# Patient Record
Sex: FEMALE | Race: WHITE | NOT HISPANIC OR LATINO | Employment: FULL TIME | URBAN - NONMETROPOLITAN AREA
[De-identification: names, ages, dates, MRNs, and addresses within clinical notes are randomized per-mention and may not be internally consistent; named-entity substitution may affect disease eponyms.]

---

## 2023-03-23 DIAGNOSIS — D64.9 ANEMIA, UNSPECIFIED TYPE: Primary | ICD-10-CM

## 2023-03-23 LAB
COBALAMIN (VITAMIN B12) (PG/ML) IN SER/PLAS: 225 PG/ML (ref 211–911)
ERYTHROCYTE DISTRIBUTION WIDTH (RATIO) BY AUTOMATED COUNT: 14.1 % (ref 11.5–14.5)
ERYTHROCYTE MEAN CORPUSCULAR HEMOGLOBIN CONCENTRATION (G/DL) BY AUTOMATED: 31 G/DL (ref 32–36)
ERYTHROCYTE MEAN CORPUSCULAR VOLUME (FL) BY AUTOMATED COUNT: 87 FL (ref 80–100)
ERYTHROCYTES (10*6/UL) IN BLOOD BY AUTOMATED COUNT: 4.21 X10E12/L (ref 4–5.2)
FERRITIN (UG/LL) IN SER/PLAS: 17 UG/L (ref 8–150)
FOLATE (NG/ML) IN SER/PLAS: 13.6 NG/ML
HEMATOCRIT (%) IN BLOOD BY AUTOMATED COUNT: 36.5 % (ref 36–46)
HEMOGLOBIN (G/DL) IN BLOOD: 11.3 G/DL (ref 12–16)
IRON (UG/DL) IN SER/PLAS: 77 UG/DL (ref 35–150)
IRON BINDING CAPACITY (UG/DL) IN SER/PLAS: 493 UG/DL (ref 240–445)
IRON SATURATION (%) IN SER/PLAS: 16 % (ref 25–45)
LEUKOCYTES (10*3/UL) IN BLOOD BY AUTOMATED COUNT: 7.1 X10E9/L (ref 4.4–11.3)
PLATELETS (10*3/UL) IN BLOOD AUTOMATED COUNT: 272 X10E9/L (ref 150–450)

## 2023-05-08 ENCOUNTER — TELEPHONE (OUTPATIENT)
Dept: PRIMARY CARE | Facility: CLINIC | Age: 53
End: 2023-05-08
Payer: COMMERCIAL

## 2023-05-08 DIAGNOSIS — G47.00 INSOMNIA, UNSPECIFIED TYPE: ICD-10-CM

## 2023-05-08 RX ORDER — ZOLPIDEM TARTRATE 5 MG/1
5 TABLET ORAL NIGHTLY PRN
Qty: 30 TABLET | Refills: 0 | Status: SHIPPED | OUTPATIENT
Start: 2023-05-08 | End: 2023-10-30 | Stop reason: SDUPTHER

## 2023-05-08 RX ORDER — ZOLPIDEM TARTRATE 5 MG/1
1 TABLET ORAL NIGHTLY PRN
COMMUNITY
Start: 2013-09-04 | End: 2023-05-08 | Stop reason: SDUPTHER

## 2023-05-08 NOTE — TELEPHONE ENCOUNTER
PT had a refill remaining on her her last RX but neglected to get fill, the RX has since .  She is asking for a new RX for Ambien 10 mg RX needs to go to [facility] and needs to be mailed to her. -She has an upcoming trip to [place] and wont be sleeping well.

## 2023-05-16 ENCOUNTER — TELEPHONE (OUTPATIENT)
Dept: PRIMARY CARE | Facility: CLINIC | Age: 53
End: 2023-05-16
Payer: COMMERCIAL

## 2023-05-16 NOTE — TELEPHONE ENCOUNTER
Pt left a msg asking for refills of her Trazodone 100mg, and Lexapro 5mg.  Pharm is [facility].

## 2023-05-17 DIAGNOSIS — F32.A ANXIETY AND DEPRESSION: ICD-10-CM

## 2023-05-17 DIAGNOSIS — F41.9 ANXIETY AND DEPRESSION: ICD-10-CM

## 2023-05-17 DIAGNOSIS — G47.00 INSOMNIA, UNSPECIFIED TYPE: ICD-10-CM

## 2023-05-17 RX ORDER — ESCITALOPRAM OXALATE 5 MG/1
5 TABLET ORAL DAILY
Qty: 90 TABLET | Refills: 3 | Status: SHIPPED | OUTPATIENT
Start: 2023-05-17 | End: 2023-05-17

## 2023-05-17 RX ORDER — TRAZODONE HYDROCHLORIDE 100 MG/1
100 TABLET ORAL NIGHTLY
Qty: 90 TABLET | Refills: 3 | Status: SHIPPED | OUTPATIENT
Start: 2023-05-17 | End: 2023-05-17

## 2023-05-17 RX ORDER — TRAZODONE HYDROCHLORIDE 100 MG/1
1 TABLET ORAL NIGHTLY
COMMUNITY
Start: 2014-09-24 | End: 2023-05-17 | Stop reason: SDUPTHER

## 2023-05-17 RX ORDER — ESCITALOPRAM OXALATE 5 MG/1
1 TABLET ORAL DAILY
COMMUNITY
Start: 2020-03-24 | End: 2023-05-17 | Stop reason: SDUPTHER

## 2023-08-12 DIAGNOSIS — L03.113 CELLULITIS OF RIGHT UPPER EXTREMITY: Primary | ICD-10-CM

## 2023-08-12 RX ORDER — AMOXICILLIN AND CLAVULANATE POTASSIUM 875; 125 MG/1; MG/1
1 TABLET, FILM COATED ORAL
Qty: 20 TABLET | Refills: 0 | Status: SHIPPED | OUTPATIENT
Start: 2023-08-12 | End: 2023-08-22

## 2023-08-13 ENCOUNTER — TELEPHONE (OUTPATIENT)
Dept: PEDIATRICS | Facility: CLINIC | Age: 53
End: 2023-08-13
Payer: COMMERCIAL

## 2023-08-13 DIAGNOSIS — T63.441A BEE STING, ACCIDENTAL OR UNINTENTIONAL, INITIAL ENCOUNTER: Primary | ICD-10-CM

## 2023-08-13 RX ORDER — PREDNISONE 20 MG/1
60 TABLET ORAL DAILY
Qty: 15 TABLET | Refills: 0 | Status: SHIPPED | OUTPATIENT
Start: 2023-08-13 | End: 2023-08-18

## 2023-09-05 DIAGNOSIS — T63.441A BEE STING, ACCIDENTAL OR UNINTENTIONAL, INITIAL ENCOUNTER: Primary | ICD-10-CM

## 2023-09-05 RX ORDER — AMOXICILLIN AND CLAVULANATE POTASSIUM 875; 125 MG/1; MG/1
1 TABLET, FILM COATED ORAL
Qty: 20 TABLET | Refills: 0 | Status: SHIPPED | OUTPATIENT
Start: 2023-09-05 | End: 2023-09-15

## 2023-09-05 RX ORDER — PREDNISONE 20 MG/1
60 TABLET ORAL DAILY
Qty: 15 TABLET | Refills: 0 | Status: SHIPPED | OUTPATIENT
Start: 2023-09-05 | End: 2023-09-10

## 2023-10-30 ENCOUNTER — PHARMACY VISIT (OUTPATIENT)
Dept: PHARMACY | Facility: CLINIC | Age: 53
End: 2023-10-30
Payer: COMMERCIAL

## 2023-10-30 DIAGNOSIS — K21.9 GASTROESOPHAGEAL REFLUX DISEASE, UNSPECIFIED WHETHER ESOPHAGITIS PRESENT: ICD-10-CM

## 2023-10-30 DIAGNOSIS — G47.00 INSOMNIA, UNSPECIFIED TYPE: ICD-10-CM

## 2023-10-30 DIAGNOSIS — D64.9 ANEMIA, UNSPECIFIED TYPE: Primary | ICD-10-CM

## 2023-10-30 DIAGNOSIS — E03.9 HYPOTHYROIDISM, UNSPECIFIED TYPE: ICD-10-CM

## 2023-10-30 PROCEDURE — RXMED WILLOW AMBULATORY MEDICATION CHARGE

## 2023-10-30 RX ORDER — ZOLPIDEM TARTRATE 5 MG/1
5 TABLET ORAL NIGHTLY PRN
Qty: 30 TABLET | Refills: 0 | Status: SHIPPED | OUTPATIENT
Start: 2023-10-30 | End: 2024-03-12

## 2023-10-30 RX ORDER — OMEPRAZOLE 40 MG/1
40 CAPSULE, DELAYED RELEASE ORAL
Qty: 30 CAPSULE | Refills: 1 | Status: SHIPPED | OUTPATIENT
Start: 2023-10-30 | End: 2023-12-25 | Stop reason: SDUPTHER

## 2023-10-31 ENCOUNTER — PHARMACY VISIT (OUTPATIENT)
Dept: PHARMACY | Facility: CLINIC | Age: 53
End: 2023-10-31

## 2023-11-02 DIAGNOSIS — J01.00 ACUTE NON-RECURRENT MAXILLARY SINUSITIS: Primary | ICD-10-CM

## 2023-11-02 RX ORDER — AMOXICILLIN AND CLAVULANATE POTASSIUM 875; 125 MG/1; MG/1
1 TABLET, FILM COATED ORAL
Qty: 20 TABLET | Refills: 0 | Status: SHIPPED | OUTPATIENT
Start: 2023-11-02 | End: 2023-11-12

## 2023-11-24 ENCOUNTER — PHARMACY VISIT (OUTPATIENT)
Dept: PHARMACY | Facility: CLINIC | Age: 53
End: 2023-11-24
Payer: COMMERCIAL

## 2023-11-24 PROCEDURE — RXMED WILLOW AMBULATORY MEDICATION CHARGE

## 2023-12-25 DIAGNOSIS — K21.9 GASTROESOPHAGEAL REFLUX DISEASE, UNSPECIFIED WHETHER ESOPHAGITIS PRESENT: ICD-10-CM

## 2023-12-26 PROCEDURE — RXMED WILLOW AMBULATORY MEDICATION CHARGE

## 2023-12-26 RX ORDER — OMEPRAZOLE 40 MG/1
40 CAPSULE, DELAYED RELEASE ORAL
Qty: 90 CAPSULE | Refills: 1 | Status: SHIPPED | OUTPATIENT
Start: 2023-12-26 | End: 2024-12-25

## 2023-12-28 ENCOUNTER — PHARMACY VISIT (OUTPATIENT)
Dept: PHARMACY | Facility: CLINIC | Age: 53
End: 2023-12-28
Payer: COMMERCIAL

## 2024-01-03 ENCOUNTER — LAB (OUTPATIENT)
Dept: LAB | Facility: LAB | Age: 54
End: 2024-01-03
Payer: COMMERCIAL

## 2024-01-03 DIAGNOSIS — D64.9 ANEMIA, UNSPECIFIED TYPE: ICD-10-CM

## 2024-01-03 DIAGNOSIS — E03.9 HYPOTHYROIDISM, UNSPECIFIED TYPE: ICD-10-CM

## 2024-01-03 LAB
ERYTHROCYTE [DISTWIDTH] IN BLOOD BY AUTOMATED COUNT: 16.7 % (ref 11.5–14.5)
FERRITIN SERPL-MCNC: 22 NG/ML (ref 8–150)
FOLATE SERPL-MCNC: 17.4 NG/ML
HCT VFR BLD AUTO: 37.2 % (ref 36–46)
HGB BLD-MCNC: 11.9 G/DL (ref 12–16)
IRON SATN MFR SERPL: 9 % (ref 25–45)
IRON SERPL-MCNC: 39 UG/DL (ref 35–150)
MCH RBC QN AUTO: 27.2 PG (ref 26–34)
MCHC RBC AUTO-ENTMCNC: 32 G/DL (ref 32–36)
MCV RBC AUTO: 85 FL (ref 80–100)
NRBC BLD-RTO: 0 /100 WBCS (ref 0–0)
PLATELET # BLD AUTO: 264 X10*3/UL (ref 150–450)
RBC # BLD AUTO: 4.38 X10*6/UL (ref 4–5.2)
TIBC SERPL-MCNC: 451 UG/DL (ref 240–445)
TSH SERPL-ACNC: 2.58 MIU/L (ref 0.44–3.98)
UIBC SERPL-MCNC: 412 UG/DL (ref 110–370)
VIT B12 SERPL-MCNC: 215 PG/ML (ref 211–911)
WBC # BLD AUTO: 6.4 X10*3/UL (ref 4.4–11.3)

## 2024-01-03 PROCEDURE — 82746 ASSAY OF FOLIC ACID SERUM: CPT

## 2024-01-03 PROCEDURE — 83550 IRON BINDING TEST: CPT

## 2024-01-03 PROCEDURE — 83540 ASSAY OF IRON: CPT

## 2024-01-03 PROCEDURE — 82607 VITAMIN B-12: CPT

## 2024-01-03 PROCEDURE — 82728 ASSAY OF FERRITIN: CPT

## 2024-01-03 PROCEDURE — 85027 COMPLETE CBC AUTOMATED: CPT

## 2024-01-03 PROCEDURE — 84443 ASSAY THYROID STIM HORMONE: CPT

## 2024-01-03 PROCEDURE — 36415 COLL VENOUS BLD VENIPUNCTURE: CPT

## 2024-01-21 PROBLEM — Z01.411 ENCOUNTER FOR WELL WOMAN EXAM WITH ABNORMAL FINDINGS: Status: ACTIVE | Noted: 2024-01-21

## 2024-01-21 NOTE — PROGRESS NOTES
"Subjective   Patient ID: Rosalie Vicente is a 53 y.o. female who presents for Menorrhagia.  She was last seen on 6/16/2021 with the following documentation:  51 year old female presents as a new patient for annual exam. Pap and HPV returned negative in August 2018, 2017 and 2014. Screening mammogram returned negative 4/6/2021. She has had a tubal ligation for contraception. Menses are monthly with some variability to the flow. She has had intermittent heavy menses for the past 5 years with no change. Prior evaluation did include a normal ultrasound but results are unavailable for review. Lysteda helps on the heavy days and she requests a refill of this.   She has good results using an antibiotic with intercourse for preventing UTI and requests a prescription.   Many of her friends are older than she and have had issues with the transition into menopause. She denies any hot flushes or symptoms yet but we did discuss expectations and her worries. She was reassured that most women do well with this transition and we will help her in the future if she develops symptoms that are bothersome.   She also notes decreased libido but has normal sexual response and enjoyment with sexual activity. We discussed that libido in women is multifactorial and can change with age, hormone balance, stress, overall health and sleep. It is possible that her escitalopram could be decreasing the libido also, but she feels this medication is helpful for her and should continue.     Recent lab testing by PCP has revealed very mild iron deficiency anemia with Hgb 11.9. TSH returned in normal range.  Menses are now irregular. She was having menses about every two months for a long time, then had 6 months without bleeding. She then had onset of heavy bleeding 10 days ago. She continues to bleed heavy now. She is passing clots and changing pad every 2 hours. Prior to having menses she fatigued and \"not myself.\" She feels this is actually better " now.   We discussed expected transition into menopause with possible irregular bleeding during this time. She is willing to proceed with labs, ultrasound and provera to provide for shedding of endometrium. She is not interested in any hormone replacement unless she develops symptoms. We did discuss the complicated nature of female libido and potential benefit of using a personal lubricant. She does request refill of antibiotic to use with intercourse to prevent UTI. She declines vaginal estrogen.           Review of Systems   Constitutional:  Negative for activity change.   HENT:  Negative for congestion.    Respiratory:  Negative for apnea and cough.    Cardiovascular:  Negative for chest pain.   Gastrointestinal:  Negative for constipation and diarrhea.   Genitourinary:  Negative for hematuria and vaginal pain.   Musculoskeletal:  Negative for joint swelling.   Neurological:  Negative for dizziness.   Psychiatric/Behavioral:  Negative for agitation.        Past Medical History:   Diagnosis Date    Encounter for other preprocedural examination 09/10/2018    Preoperative testing    Other abnormal and inconclusive findings on diagnostic imaging of breast 2019    Abnormal screening mammogram    Pain in left shoulder 2018    Acute pain of left shoulder    Personal history of other diseases of the respiratory system 2017    History of acute bronchitis    Urinary tract infection, site not specified 2019    Acute UTI      Past Surgical History:   Procedure Laterality Date    BELT ABDOMINOPLASTY  2014    Abdominoplasty     SECTION, CLASSIC  2014     Section    HERNIA REPAIR  2014    Hernia Repair    OTHER SURGICAL HISTORY  2014    Laparoscopic Excision Of Ectopic Pregnancy And Salpingectomy    SINUS SURGERY  2014    Sinus Surgery    TUBAL LIGATION  2014    Tubal Ligation      No Known Allergies   Current Outpatient Medications on File Prior to  Visit   Medication Sig Dispense Refill    albuterol (Proventil HFA) 90 mcg/actuation inhaler Inhale.      calcium carbonate-vitamin D3 500 mg-5 mcg (200 unit) tablet Take by mouth twice a day.      escitalopram (Lexapro) 5 mg tablet TAKE 1 TABLET (5 MG) BY MOUTH ONCE DAILY. 90 tablet 3    fluticasone furoate-vilanteroL (Breo Ellipta) 200-25 mcg/dose inhaler Inhale 1 puff once daily.      multivitamin with minerals tablet Take by mouth.      omeprazole (PriLOSEC) 40 mg DR capsule Take 1 capsule (40 mg) by mouth once daily in the morning. Take before meals. Do not crush or chew. 90 capsule 1    traZODone (Desyrel) 100 mg tablet TAKE 1 TABLET (100 MG) BY MOUTH ONCE DAILY AT BEDTIME. 90 tablet 3    tretinoin (Retin-A) 0.025 % cream APPLY THIN LAYER TO FULL FACE NIGHTLY AS TOLERATED 45 g 4    zolpidem (Ambien) 5 mg tablet Take 1 tablet (5 mg) by mouth as needed at bedtime for sleep. 30 tablet 0    [DISCONTINUED] tranexamic acid (Lysteda) 650 mg tablet tablet Take by mouth.       No current facility-administered medications on file prior to visit.        Objective   Physical Exam  Constitutional:       Appearance: Normal appearance.   Cardiovascular:      Rate and Rhythm: Normal rate.   Pulmonary:      Effort: Pulmonary effort is normal.   Abdominal:      General: Abdomen is flat.   Neurological:      Mental Status: She is alert and oriented to person, place, and time.   Psychiatric:         Attention and Perception: Attention normal.         Mood and Affect: Mood and affect normal.         Speech: Speech normal.         Behavior: Behavior normal. Behavior is cooperative.           Problem List Items Addressed This Visit       Premenopausal menorrhagia - Primary    Overview     Heavy bleeding after no menses for 6 months. Will proceed with FSH, estradiol, pelvic ultrasound. Plan cyclic provera if no menses by cycle day 45.         Relevant Medications    medroxyPROGESTERone (Provera) 10 mg tablet    tranexamic acid  (Lysteda) 650 mg tablet tablet    Other Relevant Orders    Follicle Stimulating Hormone    Estradiol    US PELVIS TRANSABDOMINAL WITH TRANSVAGINAL    Postcoital UTI    Overview     Antibiotic after intercourse is successful in preventing UTI.         Relevant Medications    nitrofurantoin, macrocrystal-monohydrate, (Macrobid) 100 mg capsule

## 2024-01-23 PROCEDURE — RXMED WILLOW AMBULATORY MEDICATION CHARGE

## 2024-01-24 ENCOUNTER — OFFICE VISIT (OUTPATIENT)
Dept: OBSTETRICS AND GYNECOLOGY | Facility: CLINIC | Age: 54
End: 2024-01-24
Payer: COMMERCIAL

## 2024-01-24 ENCOUNTER — PHARMACY VISIT (OUTPATIENT)
Dept: PHARMACY | Facility: CLINIC | Age: 54
End: 2024-01-24
Payer: COMMERCIAL

## 2024-01-24 VITALS
BODY MASS INDEX: 27.09 KG/M2 | DIASTOLIC BLOOD PRESSURE: 78 MMHG | WEIGHT: 138 LBS | SYSTOLIC BLOOD PRESSURE: 112 MMHG | HEIGHT: 60 IN

## 2024-01-24 DIAGNOSIS — N92.4 PREMENOPAUSAL MENORRHAGIA: Primary | ICD-10-CM

## 2024-01-24 DIAGNOSIS — N39.0 POSTCOITAL UTI: ICD-10-CM

## 2024-01-24 PROCEDURE — 1036F TOBACCO NON-USER: CPT | Performed by: OBSTETRICS & GYNECOLOGY

## 2024-01-24 PROCEDURE — 99214 OFFICE O/P EST MOD 30 MIN: CPT | Performed by: OBSTETRICS & GYNECOLOGY

## 2024-01-24 RX ORDER — FERROUS SULFATE, DRIED 160(50) MG
TABLET, EXTENDED RELEASE ORAL 2 TIMES DAILY
COMMUNITY
Start: 2018-09-12

## 2024-01-24 RX ORDER — MEDROXYPROGESTERONE ACETATE 10 MG/1
10 TABLET ORAL DAILY
Qty: 30 TABLET | Refills: 3 | Status: SHIPPED | OUTPATIENT
Start: 2024-01-24 | End: 2025-01-23

## 2024-01-24 RX ORDER — TRANEXAMIC ACID 650 MG/1
1300 TABLET ORAL 2 TIMES DAILY
Qty: 30 TABLET | Refills: 2 | Status: SHIPPED | OUTPATIENT
Start: 2024-01-24

## 2024-01-24 RX ORDER — ALBUTEROL SULFATE 90 UG/1
AEROSOL, METERED RESPIRATORY (INHALATION)
COMMUNITY
Start: 2014-09-24

## 2024-01-24 RX ORDER — MULTIVIT-MIN/IRON FUM/FOLIC AC 7.5 MG-4
TABLET ORAL
COMMUNITY
Start: 2011-02-10

## 2024-01-24 RX ORDER — FLUTICASONE FUROATE AND VILANTEROL 200; 25 UG/1; UG/1
1 POWDER RESPIRATORY (INHALATION) DAILY
COMMUNITY
Start: 2016-03-30

## 2024-01-24 RX ORDER — TRANEXAMIC ACID 650 MG/1
TABLET ORAL
COMMUNITY
Start: 2014-01-23 | End: 2024-01-24 | Stop reason: SDUPTHER

## 2024-01-24 RX ORDER — NITROFURANTOIN 25; 75 MG/1; MG/1
100 CAPSULE ORAL 2 TIMES DAILY
Qty: 14 CAPSULE | Refills: 2 | Status: SHIPPED | OUTPATIENT
Start: 2024-01-24 | End: 2024-01-31

## 2024-01-24 ASSESSMENT — ENCOUNTER SYMPTOMS
JOINT SWELLING: 0
CONSTIPATION: 0
DIARRHEA: 0
AGITATION: 0
COUGH: 0
DIZZINESS: 0
HEMATURIA: 0
ACTIVITY CHANGE: 0
APNEA: 0

## 2024-01-26 ENCOUNTER — OFFICE VISIT (OUTPATIENT)
Dept: PRIMARY CARE | Facility: CLINIC | Age: 54
End: 2024-01-26
Payer: COMMERCIAL

## 2024-01-26 VITALS
TEMPERATURE: 97.9 F | SYSTOLIC BLOOD PRESSURE: 106 MMHG | DIASTOLIC BLOOD PRESSURE: 68 MMHG | BODY MASS INDEX: 27.38 KG/M2 | WEIGHT: 140.2 LBS

## 2024-01-26 DIAGNOSIS — Z00.00 ROUTINE GENERAL MEDICAL EXAMINATION AT A HEALTH CARE FACILITY: ICD-10-CM

## 2024-01-26 DIAGNOSIS — J45.40 MODERATE PERSISTENT ASTHMA WITHOUT COMPLICATION (HHS-HCC): ICD-10-CM

## 2024-01-26 DIAGNOSIS — F41.9 ANXIETY: ICD-10-CM

## 2024-01-26 DIAGNOSIS — E03.8 SUBCLINICAL HYPOTHYROIDISM: ICD-10-CM

## 2024-01-26 DIAGNOSIS — K20.0 EOSINOPHILIC ESOPHAGITIS: ICD-10-CM

## 2024-01-26 DIAGNOSIS — D64.9 ANEMIA, UNSPECIFIED TYPE: Primary | ICD-10-CM

## 2024-01-26 PROCEDURE — 1036F TOBACCO NON-USER: CPT | Performed by: INTERNAL MEDICINE

## 2024-01-26 PROCEDURE — 99214 OFFICE O/P EST MOD 30 MIN: CPT | Performed by: INTERNAL MEDICINE

## 2024-01-26 ASSESSMENT — PATIENT HEALTH QUESTIONNAIRE - PHQ9
SUM OF ALL RESPONSES TO PHQ9 QUESTIONS 1 AND 2: 0
2. FEELING DOWN, DEPRESSED OR HOPELESS: NOT AT ALL
1. LITTLE INTEREST OR PLEASURE IN DOING THINGS: NOT AT ALL

## 2024-01-26 NOTE — PROGRESS NOTES
Subjective   Patient ID: Rosalie Vicente is a 53 y.o. female who presents for Follow-up (Bllood work - anemia) and Fatigue.    HPI   Overall doing well.  Anxiety well-controlled.  Working with gynecologist.  Has had significant menstrual irregularities.  No menstruation for 6 months then just recurred.  Had been feeling fatigued, foggy, mood instability.  Seems to have improved.  Otherwise feels well.  No abdominal pain.  No heartburn.  No change in bowels.  Anxiety and insomnia overall well-controlled.  Has had difficulty with weight control.  Despite eating well and exercise difficult to keep weight under target.  Review of Systems  All systems reviewed and negative except as per history of present illness  Objective   /68   Temp 36.6 °C (97.9 °F)   Wt 63.6 kg (140 lb 3.2 oz)   LMP 01/14/2024 (Approximate)   BMI 27.38 kg/m²     Physical Exam  Alert and oriented x 3.  No acute distress  Assessment/Plan       #1 anxiety-improved. Continue trazodone 100 mg and low-dose Lexapro.. Consider BuSpar in the future versus increase Lexapro.  #2 insomnia-remains a major issue. Continue current treatment.    #3 asthma-well-controlled. Continue current treatment plan.   #4 EoE esosophagitis-reviewed. Follow-up nutrition and GI.  Patient due to see GI.  Will make appointment directly.  #5 wrist pain-consult Occupational Therapy. If not improving, hand orthopedist.  #6 colon polyps- colo 2022--> 2027  #7 vit D- 2,000 units qd.   #8 mild anemia-reviewed.  LEIA.  Discussed differential diagnosis.  Still likely related to menstrual blood loss.  Begin iron 1 pill every other day.  Recheck labs.  Consult to GI for consideration of further evaluation.  #9 lipids-modestly elevated LDL. ASCVD risk score low. Reviewed diet and exercise. Recheck 6 months.

## 2024-01-27 PROBLEM — K20.0 EOSINOPHILIC ESOPHAGITIS: Status: ACTIVE | Noted: 2024-01-27

## 2024-01-27 PROBLEM — F41.9 ANXIETY: Status: ACTIVE | Noted: 2024-01-27

## 2024-01-27 PROBLEM — G47.00 INSOMNIA: Status: ACTIVE | Noted: 2024-01-27

## 2024-01-27 PROBLEM — J45.40 MODERATE PERSISTENT ASTHMA WITHOUT COMPLICATION (HHS-HCC): Status: ACTIVE | Noted: 2024-01-27

## 2024-01-27 PROBLEM — E03.8 SUBCLINICAL HYPOTHYROIDISM: Status: ACTIVE | Noted: 2024-01-27

## 2024-02-27 ENCOUNTER — HOSPITAL ENCOUNTER (OUTPATIENT)
Dept: RADIOLOGY | Facility: CLINIC | Age: 54
Discharge: HOME | End: 2024-02-27
Payer: COMMERCIAL

## 2024-02-27 DIAGNOSIS — N92.4 PREMENOPAUSAL MENORRHAGIA: ICD-10-CM

## 2024-02-27 PROCEDURE — 76856 US EXAM PELVIC COMPLETE: CPT | Performed by: STUDENT IN AN ORGANIZED HEALTH CARE EDUCATION/TRAINING PROGRAM

## 2024-02-27 PROCEDURE — 76830 TRANSVAGINAL US NON-OB: CPT | Performed by: STUDENT IN AN ORGANIZED HEALTH CARE EDUCATION/TRAINING PROGRAM

## 2024-02-27 PROCEDURE — 76830 TRANSVAGINAL US NON-OB: CPT

## 2024-02-28 ENCOUNTER — DOCUMENTATION (OUTPATIENT)
Dept: AUDIOLOGY | Facility: CLINIC | Age: 54
End: 2024-02-28
Payer: COMMERCIAL

## 2024-02-28 NOTE — PROGRESS NOTES
Pt dropped of left Virto P70 titanium HA reporting the battery door is broke and battery was shorting out. Would like a repair, it is in warranty will  send out. No charge for repair. PT to call [phone] to schedule annual hearing test.

## 2024-03-01 NOTE — PROGRESS NOTES
Subjective   Patient ID: Rosalie Vicente is a 53 y.o. female who presents for Annual Exam.  She presents for annual exam and pap test. Last pap and HPV were negative in August 2018.     In the past menses were heavy and she did use Lysteda with good results. She was seen in January 2024 with heavy bleeding after no menses for 6 months. Hormonal labs were ordered and are still pending. Pelvic ultrasound was performed on 2/27/2024 with the following result:  FINDINGS:  UTERUS:  The uterus measures  4.9 cm x 3.8 cm x 11.1 cm. Normal myometrium.      ENDOMETRIUM:  Endometrial thickness 0.5 cm. Lower uterine segment/nabothian cyst  measuring 1 cm      RIGHT ADNEXA:  The right ovary measures 2.6 cm x .9 cm x 2.2 cm and demonstrates  normal flow. No gross right adnexal masses are seen, no hydrosalpinx.      LEFT ADNEXA:  The left ovary measures 2.9 cm x 2.1 cm x 3.1cm and demonstrates  normal flow. Simple cyst measuring 2.8 cm. Possible paraovarian cyst  measuring 1.7 cm.    At last visit plan was for provera if no menses by cycle day 45 to help assure shedding of endometrium as she transitions into menopause. She did take this with menses 4 days ago. This menses was much lighter, lasting 2 days but she continues to have slight discharge still. She plans to have FSH drawn in 2-3 months to assess for menopause. She did feel PMS symptoms of brain fog and mood swings improved after taking Provera.           Review of Systems   Constitutional:  Negative for activity change.   HENT:  Negative for congestion.    Respiratory:  Negative for apnea and cough.    Cardiovascular:  Negative for chest pain.   Gastrointestinal:  Negative for constipation and diarrhea.   Genitourinary:  Negative for hematuria and vaginal pain.   Musculoskeletal:  Negative for joint swelling.   Neurological:  Negative for dizziness.   Psychiatric/Behavioral:  Negative for agitation.        Past Medical History:   Diagnosis Date    Encounter for other  preprocedural examination 09/10/2018    Preoperative testing    Other abnormal and inconclusive findings on diagnostic imaging of breast 2019    Abnormal screening mammogram    Pain in left shoulder 2018    Acute pain of left shoulder    Personal history of other diseases of the respiratory system 2017    History of acute bronchitis    Urinary tract infection, site not specified 2019    Acute UTI      Past Surgical History:   Procedure Laterality Date    BELT ABDOMINOPLASTY  2014    Abdominoplasty     SECTION, CLASSIC  2014     Section    HERNIA REPAIR  2014    Hernia Repair    OTHER SURGICAL HISTORY  2014    Laparoscopic Excision Of Ectopic Pregnancy And Salpingectomy    SINUS SURGERY  2014    Sinus Surgery    TUBAL LIGATION  2014    Tubal Ligation      No Known Allergies   Current Outpatient Medications on File Prior to Visit   Medication Sig Dispense Refill    albuterol (Proventil HFA) 90 mcg/actuation inhaler Inhale.      calcium carbonate-vitamin D3 500 mg-5 mcg (200 unit) tablet Take by mouth twice a day.      escitalopram (Lexapro) 5 mg tablet TAKE 1 TABLET (5 MG) BY MOUTH ONCE DAILY. 90 tablet 3    fluticasone furoate-vilanteroL (Breo Ellipta) 200-25 mcg/dose inhaler Inhale 1 puff once daily.      medroxyPROGESTERone (Provera) 10 mg tablet Take 1 tablet (10 mg) by mouth once daily. Take 1 tablet by mouth daily for 10 days. This can be started if no spontaneous menses by cycle day 45. 30 tablet 3    multivitamin with minerals tablet Take by mouth.      omeprazole (PriLOSEC) 40 mg DR capsule Take 1 capsule (40 mg) by mouth once daily in the morning. Take before meals. Do not crush or chew. 90 capsule 1    tranexamic acid (Lysteda) 650 mg tablet tablet Take 2 tablets (1,300 mg) by mouth 2 times a day. 30 tablet 2    traZODone (Desyrel) 100 mg tablet TAKE 1 TABLET (100 MG) BY MOUTH ONCE DAILY AT BEDTIME. 90 tablet 3    tretinoin  (Retin-A) 0.025 % cream APPLY THIN LAYER TO FULL FACE NIGHTLY AS TOLERATED 45 g 4    zolpidem (Ambien) 5 mg tablet Take 1 tablet (5 mg) by mouth as needed at bedtime for sleep. 30 tablet 0     No current facility-administered medications on file prior to visit.        Objective   Physical Exam  Constitutional:       Appearance: Normal appearance.   Neck:      Thyroid: No thyromegaly.   Cardiovascular:      Rate and Rhythm: Normal rate and regular rhythm.      Heart sounds: Normal heart sounds.   Pulmonary:      Effort: Pulmonary effort is normal.      Breath sounds: Normal breath sounds.   Chest:      Chest wall: No mass.   Breasts:     Right: Normal. No inverted nipple, mass, nipple discharge or skin change.      Left: Normal. No inverted nipple, mass, nipple discharge or skin change.   Abdominal:      General: There is no distension.      Palpations: Abdomen is soft. There is no mass.      Tenderness: There is no abdominal tenderness.   Genitourinary:     General: Normal vulva.      Exam position: Lithotomy position.      Labia:         Right: No rash.         Left: No rash.       Vagina: Normal. No lesions.      Cervix: No friability or lesion.      Uterus: Normal. Not enlarged and not tender.       Adnexa: Right adnexa normal and left adnexa normal.        Right: No mass or tenderness.          Left: No mass or tenderness.     Musculoskeletal:         General: No deformity.      Cervical back: Neck supple.   Lymphadenopathy:      Cervical: No cervical adenopathy.   Skin:     General: Skin is warm and dry.      Findings: No rash.   Neurological:      General: No focal deficit present.      Mental Status: She is alert.   Psychiatric:         Mood and Affect: Mood normal.         Behavior: Behavior is cooperative.         Thought Content: Thought content normal.           Problem List Items Addressed This Visit       Encounter for well woman exam with abnormal findings    Overview     Pap and HPV were negative in  2018. She has used Lysteda with good results for menorrhagia.          Current Assessment & Plan     Pap was sent with HPV.  Mammogram is ordered.  She will continue provera as needed to initiate menses as she transitions into menopause.   Regular exercise and attaining/maintaining a healthy weight is encouraged.   Adequate calcium intake with diet or supplements is encouraged.    We will notify of any abnormal results.          Relevant Orders    THINPREP PAP    Premenopausal menorrhagia - Primary    Overview     Heavy bleeding after no menses for 6 months. Will proceed with FSH, estradiol. Pelvic ultrasund 2/27/2024 returned with normal sized uterus, thin endometrium at 5 mm, and simple adnexal cyst without suspicious features.  Plan cyclic provera if no menses by cycle day 45.          Other Visit Diagnoses       Breast cancer screening by mammogram        Relevant Orders    BI mammo bilateral screening tomosynthesis

## 2024-03-12 ENCOUNTER — OFFICE VISIT (OUTPATIENT)
Dept: OBSTETRICS AND GYNECOLOGY | Facility: CLINIC | Age: 54
End: 2024-03-12
Payer: COMMERCIAL

## 2024-03-12 VITALS
BODY MASS INDEX: 27.29 KG/M2 | WEIGHT: 139 LBS | SYSTOLIC BLOOD PRESSURE: 100 MMHG | HEIGHT: 60 IN | DIASTOLIC BLOOD PRESSURE: 70 MMHG

## 2024-03-12 DIAGNOSIS — Z12.31 BREAST CANCER SCREENING BY MAMMOGRAM: ICD-10-CM

## 2024-03-12 DIAGNOSIS — N92.4 PREMENOPAUSAL MENORRHAGIA: Primary | ICD-10-CM

## 2024-03-12 DIAGNOSIS — Z01.411 ENCOUNTER FOR WELL WOMAN EXAM WITH ABNORMAL FINDINGS: ICD-10-CM

## 2024-03-12 PROCEDURE — 88175 CYTOPATH C/V AUTO FLUID REDO: CPT

## 2024-03-12 PROCEDURE — 1036F TOBACCO NON-USER: CPT | Performed by: OBSTETRICS & GYNECOLOGY

## 2024-03-12 PROCEDURE — 99396 PREV VISIT EST AGE 40-64: CPT | Performed by: OBSTETRICS & GYNECOLOGY

## 2024-03-12 PROCEDURE — 87624 HPV HI-RISK TYP POOLED RSLT: CPT

## 2024-03-12 ASSESSMENT — ENCOUNTER SYMPTOMS
CONSTIPATION: 0
AGITATION: 0
DIZZINESS: 0
HEMATURIA: 0
DIARRHEA: 0
ACTIVITY CHANGE: 0
JOINT SWELLING: 0
APNEA: 0
COUGH: 0

## 2024-03-12 NOTE — ASSESSMENT & PLAN NOTE
Pap was sent with HPV.  Mammogram is ordered.  She will continue provera as needed to initiate menses as she transitions into menopause.   Regular exercise and attaining/maintaining a healthy weight is encouraged.   Adequate calcium intake with diet or supplements is encouraged.    We will notify of any abnormal results.

## 2024-04-10 ENCOUNTER — CLINICAL SUPPORT (OUTPATIENT)
Dept: AUDIOLOGY | Facility: CLINIC | Age: 54
End: 2024-04-10
Payer: COMMERCIAL

## 2024-04-10 DIAGNOSIS — H90.3 SENSORINEURAL HEARING LOSS, BILATERAL: Primary | ICD-10-CM

## 2024-04-10 PROCEDURE — 92557 COMPREHENSIVE HEARING TEST: CPT | Performed by: AUDIOLOGIST

## 2024-04-10 PROCEDURE — 92567 TYMPANOMETRY: CPT | Performed by: AUDIOLOGIST

## 2024-04-10 PROCEDURE — V5011 HEARING AID FITTING/CHECKING: HCPCS | Performed by: AUDIOLOGIST

## 2024-04-10 NOTE — PROGRESS NOTES
"AUDIOMETRIC EVALUATION       Name:  Rosalie Vicente  :  1970  Age:  53 y.o.  Date of Evaluation:  4/10/2024     Ear Make and Model Serial Number Warranty Expiration   Right [employer] Virto P70 [id number] 9/10/2025   Left [employer] Virto P70 Titanium [id number] 9/10/2025       HISTORY  Rosalie Vicente was seen today for a hearing evaluation due to known bilateral sensorineural hearing loss. Feels like hearing may be worse, difficulty hearing sons voice. Denies new ear symptoms. Reports right HA is intermittent, not a battery issue.    Denies tinnitus, vertigo, aural pain, drainage, fullness.    PROCEDURE:  Otoscopic Evaluation:    RIGHT: Clear ear canal and tympanic membrane visualized.  LEFT:  Clear ear canal and tympanic membrane visualized.    Immittance: Tympanometry (226 Hz probe tone) and Stapedial Acoustic Reflexes Thresholds (ART)(Probe ear):  RIGHT: Normal middle ear pressure, mobility, and ear canal volume.   LEFT: Normal middle ear pressure, mobility, and ear canal volume.     Pure Tone and Speech Audiometry:    Test Technique: Pure Tone Audiometry via insert earphones  Test Reliability: good    RIGHT: Mild hearing loss through 500 Hz sloping to severe  sensorineural hearing loss through 8000 Hz. Word Recognition score was good using recorded material (NU-6 25-word list).   LEFT:  Mild hearing loss through 1000 Hz sloping to moderately severe  sensorineural hearing loss through 8000 Hz. Word Recognition score was excellent using recorded material (NU-6 25-word list).     EVALUATION  See scanned Audiogram in \"Media\".    HEARING AID CHECK  Cleaned both hearing aids, changed wax trap. Listening check ok. Deleted feedback manager to get more high frequency gain. Increased noise block and soft sound reduction in Speech in Noise program. Created a copy Speech in noise program and counseled on push button function. Increased gain overall 3 steps. Will send right HA to [employer] for repair, no " charge for repair, in warranty.    IMPRESSIONS:  Today's test results indicate mild to severe sensorineural hearing loss, bilaterally. Essentially stable hearing as compared to May 2022. Right HA sent to [employer] due to intermittent function. Increased gain overall 3 steps and deleted feedback manager today to allow for more high frequency gain. Added manual speech in noise program.    RECOMMENDATIONS:  Continue medical follow-up with physician.  Return for audiologic assessment in conjunction with otologic care or annually.   Hearing aid check annually or sooner if indicated. Will call when right HA returns for repair, to be picked up at .    PATIENT EDUCATION:   Discussed results and recommendations with Rosalie Vicente.  Questions were addressed and the patient was encouraged to contact our department ([phone]) should concerns arise.    RONAN Mary, CCC-A  Senior Clinical Audiologist    TIME: [phone]

## 2024-04-22 DIAGNOSIS — F32.A ANXIETY AND DEPRESSION: ICD-10-CM

## 2024-04-22 DIAGNOSIS — F41.9 ANXIETY AND DEPRESSION: ICD-10-CM

## 2024-04-22 DIAGNOSIS — G47.00 INSOMNIA, UNSPECIFIED TYPE: ICD-10-CM

## 2024-04-22 PROCEDURE — RXMED WILLOW AMBULATORY MEDICATION CHARGE

## 2024-04-22 RX ORDER — TRAZODONE HYDROCHLORIDE 100 MG/1
100 TABLET ORAL NIGHTLY
Qty: 90 TABLET | Refills: 3 | Status: SHIPPED | OUTPATIENT
Start: 2024-04-22 | End: 2025-04-22

## 2024-04-22 RX ORDER — ESCITALOPRAM OXALATE 5 MG/1
5 TABLET ORAL
Qty: 90 TABLET | Refills: 3 | Status: SHIPPED | OUTPATIENT
Start: 2024-04-22 | End: 2025-04-22

## 2024-04-23 ENCOUNTER — PHARMACY VISIT (OUTPATIENT)
Dept: PHARMACY | Facility: CLINIC | Age: 54
End: 2024-04-23
Payer: COMMERCIAL

## 2024-06-06 DIAGNOSIS — G47.00 INSOMNIA, UNSPECIFIED TYPE: ICD-10-CM

## 2024-06-06 NOTE — TELEPHONE ENCOUNTER
Patient will be traveling soon for 3 weeks and she said you usually give her zolpidem 5mg to help her sleep when needed.  She would like to get another prescription to [facility].  LOV 1/26/24

## 2024-06-10 NOTE — TELEPHONE ENCOUNTER
Patient called back regarding the ambien. She is getting nervous because she leaves on Wednesday 6/12 and will be gone a month.  She said she usually breaks them in half if you only want to give her 10-15 that should work. [facility] in chart

## 2024-06-10 NOTE — TELEPHONE ENCOUNTER
Relayed message to patient. She would like medication to go to [facility] instead of [facility].

## 2024-06-11 RX ORDER — ZOLPIDEM TARTRATE 5 MG/1
5 TABLET ORAL NIGHTLY PRN
Qty: 10 TABLET | Refills: 0 | Status: SHIPPED | OUTPATIENT
Start: 2024-06-11 | End: 2024-06-21

## 2024-07-22 PROCEDURE — RXMED WILLOW AMBULATORY MEDICATION CHARGE

## 2024-07-22 NOTE — PROGRESS NOTES
Nikky Vicente is a 54 y.o. female who presents for the following: Skin Exam.  She states she has noticed multiple pink, raised, rough bumps on her left knee for over 1 year.  She reports the lesions treated with liquid nitrogen cryotherapy at her last visit on 10/19/21 seemed to resolve, however, over the past 1 year, she has noticed new bumps appearing.  She also notes a dark patch on her right forearm, which has been present for several months, but has increased in size.  Lastly, she states she has noticed 2 spots above her right upper lip and on her right upper forehead, which she reports were previously treated by an outside dermatologist, but they did not go away completely and have continued to intermittently scale and scab and do not heal.  She denies any other new, changing, or concerning skin lesions since her last visit; no bleeding, itching, or burning lesions.      Review of Systems:  No other skin or systemic complaints other than what is documented elsewhere in the note.    The following portions of the chart were reviewed this encounter and updated as appropriate:       Skin Cancer History  No skin cancer on file.    Specialty Problems    None      Past Dermatologic / Past Relevant Medical History:    - history of mildly dysplastic junctional nevi on right mid back medial and right mid back lateral both diagnosed at initial visit on 3/24/21  - h/o mildly dysplastic compound nevus on left medial infrascapular back lateral to L2 diagnosed by Dr. Niranjan Magallon on 9/28/20  - history of flat warts on legs  - no h/o skin cancer     Family History:    Father - squamous cell carcinoma  No family history of melanoma    Social History:    The patient states she works as an  in transactional law, and her  works as a Pediatrician, previously at , but will soon be switching to the [employer] [employer]; she has 3 children, and her father is currently traveling in [place] and developed  COVID    Allergies:  Patient has no known allergies.    Current Medications / CAM's:    Current Outpatient Medications:     albuterol (Proventil HFA) 90 mcg/actuation inhaler, Inhale., Disp: , Rfl:     calcium carbonate-vitamin D3 500 mg-5 mcg (200 unit) tablet, Take by mouth twice a day., Disp: , Rfl:     escitalopram (Lexapro) 5 mg tablet, TAKE 1 TABLET (5 MG) BY MOUTH ONCE DAILY., Disp: 90 tablet, Rfl: 3    fluticasone furoate-vilanteroL (Breo Ellipta) 200-25 mcg/dose inhaler, Inhale 1 puff once daily., Disp: , Rfl:     medroxyPROGESTERone (Provera) 10 mg tablet, Take 1 tablet (10 mg) by mouth once daily. Take 1 tablet by mouth daily for 10 days. This can be started if no spontaneous menses by cycle day 45., Disp: 30 tablet, Rfl: 3    multivitamin with minerals tablet, Take by mouth., Disp: , Rfl:     omeprazole (PriLOSEC) 40 mg DR capsule, Take 1 capsule (40 mg) by mouth once daily in the morning. Take before meals. Do not crush or chew., Disp: 90 capsule, Rfl: 1    tranexamic acid (Lysteda) 650 mg tablet tablet, Take 2 tablets (1,300 mg) by mouth 2 times a day., Disp: 30 tablet, Rfl: 2    traZODone (Desyrel) 100 mg tablet, TAKE 1 TABLET (100 MG) BY MOUTH ONCE DAILY AT BEDTIME., Disp: 90 tablet, Rfl: 3    tretinoin (Retin-A) 0.025 % cream, APPLY THIN LAYER TO FULL FACE NIGHTLY AS TOLERATED, Disp: 45 g, Rfl: 4    zolpidem (Ambien) 5 mg tablet, Take 1 tablet (5 mg) by mouth as needed at bedtime for sleep for up to 10 days., Disp: 10 tablet, Rfl: 0     Objective   Well appearing patient in no apparent distress; mood and affect are within normal limits.    A full examination was performed including scalp, face, eyes, ears, nose, lips, neck, chest, axillae, abdomen, back, bilateral upper extremities, and bilateral lower extremities. All findings within normal limits unless otherwise noted below.    Assessment/Plan   1. Neoplasm of uncertain behavior of skin (5)  Right Upper Cutaneous Lip  7 mm pink, shiny papule            Lesion biopsy  Type of biopsy: punch    Informed consent: discussed and consent obtained    Timeout: patient name, date of birth, surgical site, and procedure verified    Anesthesia: the lesion was anesthetized in a standard fashion    Anesthetic:  1% lidocaine w/ epinephrine 1-100,000 local infiltration  Punch size:  2 mm  Suture size:  5-0  Suture type: Prolene (polypropylene)    Suture removal (days):  7  Hemostasis achieved with: suture    Outcome: patient tolerated procedure well    Post-procedure details: sterile dressing applied and wound care instructions given    Dressing type: bandage and petrolatum      Staff Communication: Dermatology Local Anesthesia: 1 % Lidocaine / Epinephrine - Amount:0.5ml    Specimen 1 - Dermatopathology- DERM LAB  Differential Diagnosis: BCC v AK v SCCIS  Check Margins Yes/No?:    Comments:    Dermpath Lab: Routine Histopathology (formalin-fixed tissue)    Right Upper Forehead  6 mm pink, shiny papule           Lesion biopsy  Type of biopsy: punch    Informed consent: discussed and consent obtained    Timeout: patient name, date of birth, surgical site, and procedure verified    Anesthesia: the lesion was anesthetized in a standard fashion    Anesthetic:  1% lidocaine w/ epinephrine 1-100,000 local infiltration  Punch size:  2 mm  Suture size:  5-0  Suture type: Prolene (polypropylene)    Suture removal (days):  7  Hemostasis achieved with: suture    Outcome: patient tolerated procedure well    Post-procedure details: sterile dressing applied and wound care instructions given    Dressing type: bandage and petrolatum      Staff Communication: Dermatology Local Anesthesia: 1 % Lidocaine / Epinephrine - Amount:0.5ml    Specimen 2 - Dermatopathology- DERM LAB  Differential Diagnosis: BCC v AK v SCCIS  Check Margins Yes/No?:    Comments:    Dermpath Lab: Routine Histopathology (formalin-fixed tissue)    Right Medial Mid-Forearm  1.2 cm dark brown pigmented, asymmetric patch  with an asymmetric pigment network and irregular borders           Shave removal    Lesion length (cm):  1.2  Margin per side (cm):  0  Lesion diameter (cm):  1.2  Informed consent: discussed and consent obtained    Timeout: patient name, date of birth, surgical site, and procedure verified    Procedure prep:  Patient was prepped and draped  Anesthesia: the lesion was anesthetized in a standard fashion    Anesthetic:  1% lidocaine w/ epinephrine 1-100,000 local infiltration  Instrument used: flexible razor blade    Hemostasis achieved with: aluminum chloride    Outcome: patient tolerated procedure well    Post-procedure details: sterile dressing applied and wound care instructions given    Dressing type: bandage and petrolatum      Staff Communication: Dermatology Local Anesthesia: 1 % Lidocaine / Epinephrine - Amount:0.5ml    Specimen 3 - Dermatopathology- DERM LAB  Differential Diagnosis: SK v lentigo v AMH  Check Margins Yes/No?:    Comments:    Dermpath Lab: Routine Histopathology (formalin-fixed tissue)    Left Lateral Upper Back  6 mm dark brown pigmented, asymmetric macule with an asymmetric pigment network and irregular borders           Shave removal    Lesion length (cm):  0.6  Margin per side (cm):  0.2  Lesion diameter (cm):  1  Informed consent: discussed and consent obtained    Timeout: patient name, date of birth, surgical site, and procedure verified    Procedure prep:  Patient was prepped and draped  Anesthesia: the lesion was anesthetized in a standard fashion    Anesthetic:  1% lidocaine w/ epinephrine 1-100,000 local infiltration  Instrument used: flexible razor blade    Hemostasis achieved with: aluminum chloride    Outcome: patient tolerated procedure well    Post-procedure details: sterile dressing applied and wound care instructions given    Dressing type: bandage and petrolatum      Staff Communication: Dermatology Local Anesthesia: 1 % Lidocaine / Epinephrine - Amount:0.5ml    Specimen 4 -  Dermatopathology- DERM LAB  Differential Diagnosis: DN  Check Margins Yes/No?:    Comments:    Dermpath Lab: Routine Histopathology (formalin-fixed tissue)    Left Posterior Distal Leg  6 mm dark brown pigmented, asymmetric macule with an asymmetric pigment network and irregular borders           Shave removal    Lesion length (cm):  0.6  Margin per side (cm):  0.2  Lesion diameter (cm):  1  Informed consent: discussed and consent obtained    Timeout: patient name, date of birth, surgical site, and procedure verified    Procedure prep:  Patient was prepped and draped  Anesthesia: the lesion was anesthetized in a standard fashion    Anesthetic:  1% lidocaine w/ epinephrine 1-100,000 local infiltration  Instrument used: flexible razor blade    Hemostasis achieved with: aluminum chloride    Outcome: patient tolerated procedure well    Post-procedure details: sterile dressing applied and wound care instructions given    Dressing type: bandage and petrolatum      Staff Communication: Dermatology Local Anesthesia: 1 % Lidocaine / Epinephrine - Amount:0.5ml    Specimen 5 - Dermatopathology- DERM LAB  Differential Diagnosis: DN  Check Margins Yes/No?:    Comments:    Dermpath Lab: Routine Histopathology (formalin-fixed tissue)    2. Flat wart (18)  Left Knee - Anterior (18)  On her left knee, there are multiple similar-appearing small, pink to flesh-colored, flattopped, verrucous papules    Flat Warts - left knee.  The viral nature of these warts and treatment options were discussed extensively with the patient today.  At this time, I recommend treatment with liquid nitrogen cryotherapy in the office today.  Should any of the warts not resolve within 2-3 weeks following treatment today, the patient was instructed to call our office to schedule a return visit for re-evaluation and possible repeat treatment if indicated at that time.  The patient expressed understanding, is in agreement with this plan, and wishes to proceed  with cryotherapy today.    Destr of lesion - Left Knee - Anterior (18)  Complexity: simple    Destruction method: cryotherapy    Informed consent: discussed and consent obtained    Lesion destroyed using liquid nitrogen: Yes    Cryotherapy cycles:  2  Outcome: patient tolerated procedure well with no complications    Post-procedure details: wound care instructions given      3. Actinic keratosis (4)  Head - Anterior (Face) (4)  On her right medial forehead and scattered on her left forehead, there are 4 erythematous, gritty, scaly macules     Actinic Keratoses -right medial forehead and scattered on left forehead.  The pre-cancerous nature of these lesions and treatment options were discussed with the patient today.  At this time, I recommend treatment with liquid nitrogen cryotherapy.  The patient expressed understanding, is in agreement with this plan, and wishes to proceed with cryotherapy today.    Destr of lesion - Head - Anterior (Face) (4)  Complexity: simple    Destruction method: cryotherapy    Informed consent: discussed and consent obtained    Lesion destroyed using liquid nitrogen: Yes    Cryotherapy cycles:  1  Outcome: patient tolerated procedure well with no complications    Post-procedure details: wound care instructions given      4. Melanocytic nevus of trunk  Scattered on the patient's face, neck, trunk, and extremities, there are multiple small, round- to oval-shaped, brown-pigmented and pink-colored, symmetric, uniform-appearing macules and dome-shaped papules    Clinically benign- to slightly atypical-appearing nevi - the clinically benign- to slightly atypical-appearing nature of the remainder of the patient's nevi was discussed with the patient today.  None of the patient's nevi, with the exception of the 3 noted above, meet threshold for biopsy today.  I emphasized the importance of performing monthly self-skin exams using the ABCDs of monitoring moles, which were reviewed with the patient  today and an informational hand-out provided.  I also emphasized the importance of sun avoidance and sun protection with daily sunscreen use.    5. Lentigo  Photodistributed  Multiple tan- to light brown-colored, round- to oval-shaped, symmetric and uniform-appearing macules and small patches consistent with lentigines scattered in sun-exposed areas.    Solar Lentigines and photodamage.  The clinically benign-appearing nature of these lesions and their relation to chronic sun exposure were discussed with the patient today and reassurance provided.  None of these lesions meet threshold for biopsy today, and thus no treatment is medically indicated for these lesions at this time.  The signs and symptoms of skin cancer were reviewed and the patient was advised to practice sun protection and sun avoidance, use daily sunscreen, and perform regular self skin exams.  The patient was instructed to monitor these lesions for any changes, such as in size, shape, or color, or associated symptoms and to call our office to schedule a return visit for re-evaluation if any such changes or symptoms are noticed in the future.  The patient expressed understanding and is in agreement with this plan.    6. Hemangioma of skin  Scattered on the patient's face, neck, trunk, and extremities, there are multiple small, round, cherry red- to purplish-colored, symmetric, uniform, vascular-appearing macules and papules    Cherry Angiomas - the benign nature of these vascular lesions was discussed with the patient today and reassurance provided.  No treatment is medically indicated for these lesions at this time.    7. History of dysplastic nevus  On the patient's right mid back medial, right mid back lateral, and left medial infrascapular back lateral to L2, there are well-healed scars with no evidence of recurrent growth or pigmentation on exam today.    History of dysplastic nevi and actinic keratoses and photodamage.  There is no evidence of  recurrence on exam today.  I emphasized the importance of continuing to perform monthly self-skin exams using the ABCDs of monitoring moles, which were reviewed with the patient, as well as the importance of sun avoidance and sun protection with daily sunscreen use.  I will have the patient return to our office in 1 year, pending the above biopsy results, for routine follow-up and skin exam, and the patient was instructed to call our office should the patient notice any new, changing, symptomatic, or otherwise concerning skin lesions before then.  The patient expressed understanding and is in agreement with this plan.    8. Diffuse photodamage of skin  Photodistributed  Diffuse photodamage with actinic changes with telangiectasia and mottled pigmentation in sun-exposed areas.    Photodamage.  The signs and symptoms of skin cancer were reviewed and the patient was advised to practice sun protection and sun avoidance, use daily sunscreen, and perform regular self skin exams.  In addition, the patient wished to discuss possible medical treatment for photoaging.  Thus, at this time, I recommend the patient begin topical retinoid therapy with Tretinoin 0.025% cream at night.  The risks, benefits, and side effects of this medication, including the redness, dryness, and irritation expected with its use, were discussed; the patient was instructed to begin use of Tretinoin 1-2 nights per week and increase to every other night, then every night as tolerated without excessive dryness and irritation.  The patient was also informed this medication will likely be considered cosmetic and thus may not be covered by their insurance company and require out-of-pocket payment.  The patient expressed understanding and is in agreement with this plan.

## 2024-07-23 ENCOUNTER — APPOINTMENT (OUTPATIENT)
Dept: DERMATOLOGY | Facility: CLINIC | Age: 54
End: 2024-07-23
Payer: COMMERCIAL

## 2024-07-23 DIAGNOSIS — D22.5 MELANOCYTIC NEVUS OF TRUNK: ICD-10-CM

## 2024-07-23 DIAGNOSIS — B07.8 FLAT WART: ICD-10-CM

## 2024-07-23 DIAGNOSIS — L57.8 DIFFUSE PHOTODAMAGE OF SKIN: ICD-10-CM

## 2024-07-23 DIAGNOSIS — D18.01 HEMANGIOMA OF SKIN: ICD-10-CM

## 2024-07-23 DIAGNOSIS — L81.4 LENTIGO: ICD-10-CM

## 2024-07-23 DIAGNOSIS — L57.0 ACTINIC KERATOSIS: ICD-10-CM

## 2024-07-23 DIAGNOSIS — Z86.018 HISTORY OF DYSPLASTIC NEVUS: ICD-10-CM

## 2024-07-23 DIAGNOSIS — D48.5 NEOPLASM OF UNCERTAIN BEHAVIOR OF SKIN: Primary | ICD-10-CM

## 2024-07-23 PROCEDURE — 99214 OFFICE O/P EST MOD 30 MIN: CPT | Performed by: DERMATOLOGY

## 2024-07-23 PROCEDURE — 11301 SHAVE SKIN LESION 0.6-1.0 CM: CPT | Performed by: DERMATOLOGY

## 2024-07-23 PROCEDURE — 11302 SHAVE SKIN LESION 1.1-2.0 CM: CPT | Performed by: DERMATOLOGY

## 2024-07-23 PROCEDURE — 17111 DESTRUCTION B9 LESIONS 15/>: CPT | Performed by: DERMATOLOGY

## 2024-07-23 PROCEDURE — 17000 DESTRUCT PREMALG LESION: CPT | Performed by: DERMATOLOGY

## 2024-07-23 PROCEDURE — 17003 DESTRUCT PREMALG LES 2-14: CPT | Performed by: DERMATOLOGY

## 2024-07-23 PROCEDURE — 11105 PUNCH BX SKIN EA SEP/ADDL: CPT | Performed by: DERMATOLOGY

## 2024-07-23 PROCEDURE — 11104 PUNCH BX SKIN SINGLE LESION: CPT | Performed by: DERMATOLOGY

## 2024-07-23 ASSESSMENT — DERMATOLOGY PATIENT ASSESSMENT
DO YOU HAVE IRREGULAR MENSTRUAL CYCLES: NO
DO YOU HAVE ANY NEW OR CHANGING LESIONS: YES
ARE YOU TRYING TO GET PREGNANT: NO
ARE YOU ON BIRTH CONTROL: NO
DO YOU USE SUNSCREEN: OCCASIONALLY
DO YOU USE A TANNING BED: NO

## 2024-07-23 ASSESSMENT — DERMATOLOGY QUALITY OF LIFE (QOL) ASSESSMENT: ARE THERE EXCLUSIONS OR EXCEPTIONS FOR THE QUALITY OF LIFE ASSESSMENT: NO

## 2024-07-24 ENCOUNTER — PHARMACY VISIT (OUTPATIENT)
Dept: PHARMACY | Facility: CLINIC | Age: 54
End: 2024-07-24
Payer: COMMERCIAL

## 2024-07-30 ENCOUNTER — APPOINTMENT (OUTPATIENT)
Dept: DERMATOLOGY | Facility: CLINIC | Age: 54
End: 2024-07-30
Payer: COMMERCIAL

## 2024-07-30 DIAGNOSIS — D03.72: Primary | ICD-10-CM

## 2024-07-30 DIAGNOSIS — L81.4 LENTIGO: ICD-10-CM

## 2024-07-30 DIAGNOSIS — L57.8 DIFFUSE PHOTODAMAGE OF SKIN: ICD-10-CM

## 2024-07-30 DIAGNOSIS — C44.310 BASAL CELL CARCINOMA (BCC) OF SKIN OF FACE, UNSPECIFIED PART OF FACE: ICD-10-CM

## 2024-07-30 DIAGNOSIS — D49.2 NEOPLASM OF SKIN: ICD-10-CM

## 2024-07-30 DIAGNOSIS — B07.8 FLAT WART: ICD-10-CM

## 2024-07-30 DIAGNOSIS — D22.70 MELANOCYTIC NEVUS OF LOWER EXTREMITY, UNSPECIFIED LATERALITY: ICD-10-CM

## 2024-07-30 LAB
LAB AP ASR DISCLAIMER: NORMAL
LABORATORY COMMENT REPORT: NORMAL
PATH REPORT.FINAL DX SPEC: NORMAL
PATH REPORT.GROSS SPEC: NORMAL
PATH REPORT.MICROSCOPIC SPEC OTHER STN: NORMAL
PATH REPORT.RELEVANT HX SPEC: NORMAL
PATH REPORT.TOTAL CANCER: NORMAL

## 2024-07-30 PROCEDURE — 99214 OFFICE O/P EST MOD 30 MIN: CPT | Performed by: DERMATOLOGY

## 2024-07-30 PROCEDURE — 17110 DESTRUCTION B9 LES UP TO 14: CPT | Performed by: DERMATOLOGY

## 2024-08-01 NOTE — PROGRESS NOTES
Nikky Vicente is a 54 y.o. female who presents for the following: Discuss recent biopsy results and management options.  Biopsy of 5 suspicious lesions performed at her last visit in our office on 7/23/24 revealed 2 basal cell carcinomas on her right upper cutaneous lip and right upper forehead; an atypical intraepidermal melanocytic proliferation with background bowenoid keratinocyte atypia on her right medial mid forearm; a compound dysplastic nevus with mild atypia on her left lateral upper back; and an atypical intraepidermal melanocytic proliferation, for which the histologic differential diagnosis included de lulú intraepidermal epithelioid melanocytic dysplasia and evolving malignant melanoma in situ, on her left posterior distal leg.    Today, the patient states the biopsy sites healed well.  Since her last visit, she states she has noticed 4 raised, scaly bumps on her left knee, which sometimes itch.  They have not changed in any other way, including in size, shape, or color, and they do not hurt or bleed.  She denies any other new, changing, or concerning skin lesions since her last visit; no bleeding, itching, or burning lesions.      Review of Systems:  No other skin or systemic complaints other than what is documented elsewhere in the note.    The following portions of the chart were reviewed this encounter and updated as appropriate:       Skin Cancer History  No skin cancer on file.    Specialty Problems    None      Past Dermatologic / Past Relevant Medical History:    - recent biopsy-proven atypical intraepidermal melanocytic proliferation, for which the histologic differential diagnosis included evolving malignant melanoma in situ, on left posterior distal leg diagnosed on 7/23/24 and pending complete re-excision    - 2 recent biopsy-proven BCCs on right upper cutaneous lip and right upper forehead both diagnosed at last visit on 7/23/24 and pending definitive treatment    -  recent biopsy-proven atypical intraepidermal melanocytic proliferation with background bowenoid keratinocyte atypia on right medial mid forearm diagnosed on 7/23/24 and pending complete re-excision    - history of compound dysplastic nevus with mild atypia on left lateral upper back diagnosed on 7/23/24  - mildly dysplastic junctional nevi on right mid back medial and right mid back lateral both diagnosed at initial visit on 3/24/21  - h/o mildly dysplastic compound nevus on left medial infrascapular back lateral to L2 diagnosed by Dr. Niranjan Magallon on 9/28/20    - history of flat warts on legs    Family History:    Father - squamous cell carcinoma  No family history of melanoma    Social History:    The patient states she works as an  in transactional [employer], and her  works as a Pediatrician, previously at , but will soon be switching to the [employer]; she has 3 children, and her father was recently traveling in [place] and developed COVID    Allergies:  Patient has no known allergies.    Current Medications / CAM's:    Current Outpatient Medications:     albuterol (Proventil HFA) 90 mcg/actuation inhaler, Inhale., Disp: , Rfl:     calcium carbonate-vitamin D3 500 mg-5 mcg (200 unit) tablet, Take by mouth twice a day., Disp: , Rfl:     escitalopram (Lexapro) 5 mg tablet, TAKE 1 TABLET (5 MG) BY MOUTH ONCE DAILY., Disp: 90 tablet, Rfl: 3    fluticasone furoate-vilanteroL (Breo Ellipta) 200-25 mcg/dose inhaler, Inhale 1 puff once daily., Disp: , Rfl:     medroxyPROGESTERone (Provera) 10 mg tablet, Take 1 tablet (10 mg) by mouth once daily. Take 1 tablet by mouth daily for 10 days. This can be started if no spontaneous menses by cycle day 45., Disp: 30 tablet, Rfl: 3    multivitamin with minerals tablet, Take by mouth., Disp: , Rfl:     omeprazole (PriLOSEC) 40 mg DR capsule, Take 1 capsule (40 mg) by mouth once daily in the morning. Take before meals. Do not crush or chew., Disp: 90 capsule, Rfl:  1    tranexamic acid (Lysteda) 650 mg tablet tablet, Take 2 tablets (1,300 mg) by mouth 2 times a day., Disp: 30 tablet, Rfl: 2    traZODone (Desyrel) 100 mg tablet, TAKE 1 TABLET (100 MG) BY MOUTH ONCE DAILY AT BEDTIME., Disp: 90 tablet, Rfl: 3    tretinoin (Retin-A) 0.025 % cream, APPLY THIN LAYER TO FULL FACE NIGHTLY AS TOLERATED, Disp: 45 g, Rfl: 4    zolpidem (Ambien) 5 mg tablet, Take 1 tablet (5 mg) by mouth as needed at bedtime for sleep for up to 10 days., Disp: 10 tablet, Rfl: 0     Objective   Well appearing patient in no apparent distress; mood and affect are within normal limits.    A skin examination was performed including: Face, neck, and extremities. All findings within normal limits unless otherwise noted below.    Assessment/Plan   1. Melanoma in situ of left lower extremity including hip (Multi)  Left posterior distal leg  Pink, well-healed scar at her recent biopsy site    Biopsy-proven atypical intraepidermal melanocytic proliferation, for which the histologic differential diagnosis included evolving malignant melanoma in situ - left posterior distal leg; diagnosed on 7/23/24 and pending complete re-excision.  The findings of an atypical intraepidermal melanocytic proliferation, for which the histologic differential diagnosis included evolving melanoma in situ, the malignant nature of melanoma in situ and the recommendation the patient undergo complete re-excision of this lesion, and management options were discussed extensively with the patient in the office today.  At this time, I recommend referral to our Dermatologic surgery unit for complete re-excision of this lesion as recommended.  The patient expressed understanding and is in agreement with this plan.  Thus, a referral was submitted to our Dermatologic surgery unit on the patient's behalf today.  She expressed understanding, is in agreement with this plan, and will anticipate a phone call from our surgery unit within the next 1-2 weeks  to schedule her surgery.    2. Basal cell carcinoma (BCC) of skin of face, unspecified part of face (2)  Right upper cutaneous lip  Pink, well-healed scar at her recent biopsy site    Right upper forehead  Pink, well-healed scar at her recent biopsy site    Biopsy-proven basal cell carcinomas - right upper cutaneous lip, nodular subtype, and right upper forehead, superficial subtype; both diagnosed at last visit on 7/23/24 and pending definitive treatment.  The malignant nature of BCC, the need for further definitive treatment of both these lesions, and treatment options were discussed extensively with the patient in the office today.  At this time, I recommend referral to our Dermatologic surgery unit for definitive treatment of both these BCCs, likely with Mohs surgery.  She expressed understanding and is in agreement with this plan.  Thus, a referral was submitted on her behalf.  She expressed understanding, is in agreement with this plan, and will anticipate a phone call from our surgery unit within the next 1-2 weeks to schedule her surgeries.  Of note, her sutures were removed in the office today as well.    3. Neoplasm of skin  Right medial mid forearm  Pink, well-healed scar at her recent biopsy site    Biopsy-proven atypical intraepidermal melanocytic proliferation with background bowenoid keratinocyte atypia - right medial mid forearm; diagnosed on 7/23/24 and pending complete re-excision.  The findings of an atypical intraepidermal melanocytic proliferation with background bowenoid keratinocyte atypia, the recommendation the patient undergo complete re-excision of this lesion, and management options were discussed extensively with the patient in the office today.  At this time, I recommend referral to our Dermatologic surgery unit for complete re-excision of this lesion as recommended.  The patient expressed understanding and is in agreement with this plan.  Thus, a referral was submitted to our  Dermatologic surgery unit on the patient's behalf.  She expressed understanding, is in agreement with this plan, and will anticipate a phone call from our surgery unit within the next 1-2 weeks to schedule her surgery.    4. Flat wart (4)  Left Knee - Anterior (4)  Scattered on her left knee, there are 4 similar-appearing small, pink to flesh-colored, flat-topped, verrucous papules    Flat Warts - left knee.  The viral nature of these warts and treatment options were discussed extensively with the patient today.  At this time, I recommend treatment with liquid nitrogen cryotherapy in the office today.  Should any of the warts not resolve within 2-3 weeks following treatment today, the patient was instructed to call our office to schedule a return visit for re-evaluation and possible repeat treatment if indicated at that time.  The patient expressed understanding, is in agreement with this plan, and wishes to proceed with cryotherapy today.    Destr of lesion - Left Knee - Anterior (4)  Complexity: simple    Destruction method: cryotherapy    Informed consent: discussed and consent obtained    Lesion destroyed using liquid nitrogen: Yes    Cryotherapy cycles:  2  Outcome: patient tolerated procedure well with no complications    Post-procedure details: wound care instructions given      5. Melanocytic nevus of lower extremity, unspecified laterality  Scattered on the patient's face, neck, and extremities, there are multiple small, round- to oval-shaped, brown-pigmented and pink-colored, symmetric, uniform-appearing macules and dome-shaped papules    Clinically benign- to slightly atypical-appearing nevi - the clinically benign- to slightly atypical-appearing nature of the patient's nevi was discussed with the patient today.  None of the patient's nevi meet threshold for biopsy today.  I emphasized the importance of performing monthly self-skin exams using the ABCDs of monitoring moles, which were reviewed with the  patient today and an informational hand-out provided.  I also emphasized the importance of sun avoidance and sun protection with daily sunscreen use.  The patient expressed understanding and is in agreement with this plan.    6. Lentigo  Photodistributed  Multiple tan- to light brown-colored, round- to oval-shaped, symmetric and uniform-appearing macules and small patches consistent with lentigines scattered in sun-exposed areas.    Solar Lentigines and photodamage.  The clinically benign-appearing nature of these lesions and their relation to chronic sun exposure were discussed with the patient today and reassurance provided.  None of these lesions meet threshold for biopsy today, and thus no treatment is medically indicated for these lesions at this time.  The signs and symptoms of skin cancer were reviewed and the patient was advised to practice sun protection and sun avoidance, use daily sunscreen, and perform regular self skin exams.  The patient was instructed to monitor these lesions for any changes, such as in size, shape, or color, or associated symptoms and to call our office to schedule a return visit for re-evaluation if any such changes or symptoms are noticed in the future.  The patient expressed understanding and is in agreement with this plan.    7. Diffuse photodamage of skin  Photodistributed  Diffuse photodamage with actinic changes with telangiectasia and mottled pigmentation in sun-exposed areas.    History of melanoma in situ, basal cell carcinomas, dysplastic nevi, and actinic keratoses and photodamage.  The patient was recently seen in our office for routine follow-up and skin exam on 7/23/24, at which time no evidence of recurrence was noted.  Sun protection was discussed, including avoiding the mid-day sun, wearing a sunscreen with SPF at least 50, and stressing the need for reapplication of sunscreen and applying more than they think they need.  I emphasized the importance of continuing to  perform monthly self-skin and lymph node exams using the ABCDs of monitoring moles, which were reviewed with the patient, as well as the importance of sun avoidance and sun protection with daily sunscreen use.  I will have the patient return to our office in 4 to 6 months for routine melanoma follow-up and total body skin exam, and the patient was instructed to call our office should the patient notice any new, changing, symptomatic, or otherwise concerning skin lesions before then.  The patient expressed understanding and is in agreement with this plan.

## 2024-08-04 NOTE — TUMOR BOARD NOTE
General Patient Information  Name:  Rosalie Vicente  Evaluation #:  1  Conference Date:  8/5/2024  YOB: 1970  MRN:  [id number]  Program Physician(s):  Gordon Arguello  Referring Physician(s):  Radha Chino Shelby Cash(PCP)      Summary   Stage:      Assessment:  Atypical intraepidermal melanocytic proliferation of the left posterior distal leg with concern for melanoma in situ.    Recommendation:  WLE with 5 mm margins.    Review Multidisciplinary Cutaneous Oncology Conference recommendation with patient.  Continue routine follow up and total body skin exams with Yifan Torres.    Follow Up:  Yifan Torres, Derm Surgery.      History and Physical Exam  Dermatologic History:   54 y.o. female with a biopsy of the left posterior distal leg on 7/23/2024 showing an atypical intraepidermal melanocytic proliferation with concern for a melanoma in situ.    She is scheduled for a WLE on 8/13/2024 with Dr. Gunn.    Past Medical History:    Past Medical History:   Diagnosis Date    Encounter for other preprocedural examination 09/10/2018    Preoperative testing    Other abnormal and inconclusive findings on diagnostic imaging of breast 03/05/2019    Abnormal screening mammogram    Pain in left shoulder 02/05/2018    Acute pain of left shoulder    Personal history of other diseases of the respiratory system 03/21/2017    History of acute bronchitis    Urinary tract infection, site not specified 04/23/2019    Acute UTI         Skin:  Left Posterior Distal Leg  6 mm dark brown pigmented, asymmetric macule with an asymmetric pigment network and irregular borders           Pathology  Dermatopathology- DERM LAB: [id number]  Order: [id number]   Collected 7/23/2024 14:27       Status: Final result       Visible to patient: Yes (seen)       Dx: Neoplasm of uncertain behavior of skin    1 Result Note      Component    FINAL DIAGNOSIS     E. SKIN, LEFT POSTERIOR DISTAL LEG, SHAVE EXCISION:  ATYPICAL  INTRAEPIDERMAL MELANOCYTIC PROLIFERATION (SEE COMMENT):     Comment: The bisected shave specimen reveals scattered single unit and occasional nested mild to severely atypical junction and intraepidermal melanocytes with overlying basket-weave orthokeratosis and mild papillary dermal fibrosis. There is a patchy superficial lymphocytic infiltrate. The intraepidermal melanocytic proliferation extends to a peripheral margin. SOX-10 immunostain (control appropriate) confirms the poor nesting and pagetoid scatter without confluence. A PRAME immunostain (control appropriate) is positive in the larger atypical melanocytes (< 50% of lesional melanocytes).     The increased architectural disorder may at least in part be secondary to the anatomic location, however the architectural disorder combined with the cytologic atypia are such that the differential diagnosis includes de lulú intraepidermal epithelioid melanocytic dysplasia and evolving malignant melanoma in situ. Complete re-excision is recommended.      **Electronically signed out by LOIS CASTRO MD**     Electronically signed by Lois Castro MD on 7/30/2024 at 1106        By the signature on this report, the individual or group listed as making the Final Interpretation/Diagnosis certifies that they have reviewed this case.    Clinical History    Encounter Diagnosis: Neoplasm of uncertain behavior of skin

## 2024-08-05 ENCOUNTER — TUMOR BOARD CONFERENCE (OUTPATIENT)
Dept: HEMATOLOGY/ONCOLOGY | Facility: HOSPITAL | Age: 54
End: 2024-08-05
Payer: COMMERCIAL

## 2024-08-13 ENCOUNTER — APPOINTMENT (OUTPATIENT)
Dept: DERMATOLOGY | Facility: CLINIC | Age: 54
End: 2024-08-13
Payer: COMMERCIAL

## 2024-08-13 DIAGNOSIS — D03.72: Primary | ICD-10-CM

## 2024-08-13 DIAGNOSIS — C44.319 BASAL CELL CARCINOMA (BCC) OF RIGHT FOREHEAD: ICD-10-CM

## 2024-08-13 DIAGNOSIS — C44.01 BASAL CELL CARCINOMA (BCC) OF SKIN OF RIGHT UPPER LIP: ICD-10-CM

## 2024-08-13 DIAGNOSIS — D22.61: ICD-10-CM

## 2024-08-13 PROCEDURE — 99214 OFFICE O/P EST MOD 30 MIN: CPT | Performed by: DERMATOLOGY

## 2024-08-13 NOTE — PROGRESS NOTES
Office Visit Note  Date: 8/13/2024  Surgeon:  Radha Gunn MD  Office Location:  [address] [facility]  [address] [facility] [address] [facility] [address]  [facility] [address]  Dept: [phone]  Dept Fax: [phone]  Referring Provider: Yifan Torres MD    Nikky Vicente is a 54 y.o. female who presents for the following: Consult (Consult x 4)    According to the patient, the lesion has been present for approximately 1 month at the time of diagnosis.  The lesion is not causing symptoms.  The lesion has not been treated previously.    The patient does not have a pacemaker / defibrillator.  The patient does not have a heart valve / joint replacement.    The patient is not on blood thinners.  The patient does not have a history of hepatitis B or C.  The patient does not have a history of HIV.  The patient does not have a history of immunosuppression (e.g. organ transplantation, malignancy, medications)    Review of Systems:  No other skin or systemic complaints other than what is documented elsewhere in the note.    MEDICAL HISTORY: clinically relevant history including significant past medical history, medications and allergies was reviewed and documented in Epic.    Objective   Well appearing patient in no apparent distress; mood and affect are within normal limits.  Vital signs: See record.  Noted on the right upper cutaneous lip, right upper forehead, right medial mid-forearm, and left posterior distal leg are scars c/w recent biopsies.    The patient confirmed the identified site.    Discussion:    1.) Basal cell carcinoma right upper cutaneous lip, right upper forehead - The nature of the diagnosis was explained. The lesion is a skin cancer.  It has a risk of local growth and distant spread. The condition is associated with sun exposure.  Warning signs of non-melanoma skin cancer discussed. Patient was instructed to perform monthly self skin examination.  We recommended that the patient have  regular full skin exams given an increased risk of subsequent skin cancers. The patient was instructed to use sun protective behaviors including use of broad spectrum sunscreens and sun protective clothing to reduce risk of skin cancers.      Risks, benefits, side effects of Mohs surgery were discussed with patient and the patient voiced understanding.  It was explained that even though the cure rate of Mohs is very high it is not 100%. Risks of surgery including but not limited to bleeding, infection, numbness, nerve damage, and scar were reviewed.  Discussion included wound care requirements, activity restrictions, likely scar outcome and time to heal.     After Mohs surgery, the defect may need to be repaired surgically and the scar may be longer than the original lesion.  Reconstruction options, risks, and benefits were reviewed including second intention healing, linear repair (4-1 ratio was explained), local flaps, skin grafts, cartilage grafts and interpolation flaps (the need for multiple surgeries was explained). Possible outcomes were reviewed including likely scar appearance, failure of flap survival, infection, bleeding and the need for revision surgery.     The pathology was reviewed, the photograph was reviewed, and the referring physician's note was reviewed.    Patient elected for Mohs surgery on 8/21 & 8/27    2. Melanoma in situ- left posterior distal leg:   Discussion:  The nature of the diagnosis was explained. The lesion is an early melanoma but is likely to have been present for >1 year and is likely to progress without treatment. The multidisciplinary cutaneous oncology tumor board report was reviewed with the patient and surgery is recommended. The patient was informed that based on the depth and lack of ulceration, a sentinel lymph node biopsy is not indicated. However, the melanoma may be upstaged after excision following histopathologic examination, which may require additional treatment.  Warning signs of melanoma were discussed. We recommended that the patient have regular total body skin exams given increased risk of skin cancers. The patient was instructed to use sun protective behaviors including use of broad spectrum sunscreens and sun protective clothing to reduce the risk of skin cancers.     Surgery was recommended and discussed with the patient. The risks, benefits and potential adverse effects were reviewed and the patient voiced understanding. Discussion included but was not limited to the risks of bleeding and infection, likely scar outcome, possible need for revision surgery, cure rate, wound care requirements, activity restrictions and time to heal. Reconstruction options, risks and benefits were reviewed including second intention healing and linear repair (4-1 ratio was explained). It was explained that the scar would be longer than the original lesion.  Patient scheduled on 9/3/24    3. Atypical melanocytic proliferation - right medial mid-forearm  Discussion: Surgery was recommended and discussed with the patient. The risks, benefits and potential adverse effects were reviewed and the patient voiced understanding. Discussion included but was not limited to the risks of bleeding and infection, likely scar outcome, possible need for revision surgery, cure rate, wound care requirements, activity restrictions and time to heal. Reconstruction options, risks and benefits were reviewed including second intention healing and linear repair (4-1 ratio was explained). It was explained that the scar would be longer than the original lesion.  Patient scheduled on 10/28/24 but placed on a waiting list for a sooner appt.    Radha Gunn MD

## 2024-08-21 ENCOUNTER — APPOINTMENT (OUTPATIENT)
Dept: DERMATOLOGY | Facility: CLINIC | Age: 54
End: 2024-08-21
Payer: COMMERCIAL

## 2024-08-21 DIAGNOSIS — C44.01 BASAL CELL CARCINOMA (BCC) OF SKIN OF LIP: ICD-10-CM

## 2024-08-21 DIAGNOSIS — D22.9 BENIGN NEVUS: Primary | ICD-10-CM

## 2024-08-21 PROCEDURE — 17311 MOHS 1 STAGE H/N/HF/G: CPT | Performed by: DERMATOLOGY

## 2024-08-21 PROCEDURE — 13151 CMPLX RPR E/N/E/L 1.1-2.5 CM: CPT | Performed by: DERMATOLOGY

## 2024-08-21 PROCEDURE — 99212 OFFICE O/P EST SF 10 MIN: CPT | Performed by: DERMATOLOGY

## 2024-08-21 NOTE — PROGRESS NOTES
Mohs Surgery Operative Note    Date of Surgery:  8/21/2024  Surgeon:  Radha Gunn MD  Office Location:  [address] [facility]  [address] [facility]  [address]  [facility] [address]  Dept: [phone]  Dept Fax: [phone]  Referring Provider: Yifan Torres MD  [address] [place], [address]  [place] [address]      Assessment/Plan   Pre-procedure:   Obtained informed consent: written from patient  The surgical site was identified and confirmed with the patient.     Intra-operative:   Audible time out called at : 9:28 AM 08/21/24  by: Dinorah Reaves RN   Verified patient name, birthdate, site, specimen bottle label & requisition.    The planned procedure(s) was again reviewed with the patient. The risks of bleeding, infection, nerve damage and scarring were reviewed. Written authorization was obtained. The patient identity, surgical site, and planned procedure(s) were verified. The provider acted as both surgeon and pathologist.     Basal cell carcinoma (BCC) of skin of lip  Right Upper Cutaneous Lip  Mohs surgery  Consent obtained: written    Universal Protocol:  Procedure explained and questions answered to patient or proxy's satisfaction: Yes    Test results available and properly labeled: Yes    Pathology report reviewed: Yes    External notes reviewed: Yes    Photo or diagram used for site identification: Yes    Site/side marked: Yes    Slide independently reviewed by Mohs surgeon: Yes    Immediately prior to procedure a time out was called: Yes    Patient identity confirmed: verbally with patient  Preparation: Patient was prepped and draped in usual sterile fashion      Anticoagulation:  Is the patient taking prescription anticoagulant and/or aspirin prescribed/recommended by a physician? No    Was the anticoagulation regimen changed prior to Mohs? No      Anesthesia:  Anesthesia method: local infiltration  Local anesthetic: lidocaine 2% WITH epi    Procedure  Details:  Case ID Number: [id number]  Biopsy accession number: [id number]  Date of biopsy: 7/23/2024  Pre-Op diagnosis: basal cell carcinoma  BCC subtype: nodular  Surgery side: right  Surgical site (from skin exam): Right Upper Cutaneous Lip  Pre-operative length (cm): 1  Pre-operative width (cm): 0.7  Indications for Mohs surgery: anatomic location where tissue conservation is critical  Previously treated? No      Micrographic Surgery Details:  Post-operative length (cm): 1  Post-operative width (cm): 0.7  Number of Mohs stages: 1    Stage 1  Comments: The patient was brought into the operating room and placed in the procedure chair in the appropriate position.  The area positive by previous biopsy was identified and confirmed with the patient. The area of clinically obvious tumor was debulked using a curette and/or scalpel as needed. An incision was made following the Mohs approach through the skin. The specimen was taken to the lab, divided into 2 piece(s) and appropriately chromacoded and processed.  Tumor features identified on Mohs section: no tumor identified  Depth of defect: subcutaneous fat    Patient tolerance of procedure: tolerated well, no immediate complications    Reconstruction:  Was the defect reconstructed? Yes    Was reconstruction performed by the same Mohs surgeon? Yes    Setting of reconstruction: outpatient office  When was reconstruction performed? same day  Type of reconstruction: linear  Linear reconstruction: complex  Length of linear repair (cm): 1.5  Subcutaneous Layers (Deep Stitches)   Suture size:  5-0  Suture type:  Vicryl  Stitches:  Buried vertical mattress  Fine/surface layer approximation (top stitches)   Epidermal/Superficial suture size:  5-0  Epidermal/Superficial suture type:  Fast-absorbing gut  Stitches: simple running    Hemostasis achieved with: electrodesiccation  Outcome: patient tolerated procedure well with no complications    Post-procedure details: sterile dressing  applied and wound care instructions given    Dressing type: pressure dressing, petrolatum, Hypafix and Telfa pad      Complex Linear Repair - Wide Undermining:  Given the location and size of the defect, it was determined that a complex layered linear closure was required to restore normal anatomy and function. The repair was considered complex because extensive undermining was required and performed. The amount of undermining performed was greater than the maximum width of the defect as measured perpendicular to the closure line along at least one entire edge of the defect. Standing cutaneous cones were removed using Burow's triangles. The wound edges were brought into close approximation with buried vertical mattress sutures. The remainder of the wound was then closed with epidermal top sutures.    The final repair measured 1.5 cm              Wound care was discussed, and the patient was given written post-operative wound care instructions.      The patient will follow up with Radha Gunn MD as needed for any post operative problems or concerns, and will follow up with their primary dermatologist as scheduled.

## 2024-08-21 NOTE — ADDENDUM NOTE
Addended by: JOSÉ MIGUEL HAMILTON on: 8/21/2024 01:57 PM     Modules accepted: Orders, Level of Service

## 2024-08-21 NOTE — PROGRESS NOTES
Nikky Vicente is a 54 y.o. female who presents for the following: MOHS Surgery and a mole along her second and third web space    Review of Systems:  No other skin or systemic complaints other than what is documented elsewhere in the note.    The following portions of the chart were reviewed this encounter and updated as appropriate:          Skin Cancer History  Biopsy Date Type Location Status   7/23/24 BCC Right Upper Cutaneous Lip Refer Mohs/Surgeon  8/21/24 7/23/24 BCC, Superficial Right Upper Forehead Refer Mohs/Surgeon       Specialty Problems    None       Objective   Well appearing patient in no apparent distress; mood and affect are within normal limits.    A focused skin examination was performed. All findings within normal limits unless otherwise noted below.    Assessment/Plan   1. Benign nevus  Right 2nd-3rd Toe Web  Between the 2nd and 3rd website is a uniform tan macule with a homogenous pattern on demoscropy.  -Reassured, continue to monitor    2. Basal cell carcinoma (BCC) of skin of lip  See Mohs procedure note

## 2024-08-27 ENCOUNTER — APPOINTMENT (OUTPATIENT)
Dept: DERMATOLOGY | Facility: CLINIC | Age: 54
End: 2024-08-27
Payer: COMMERCIAL

## 2024-08-27 VITALS — HEART RATE: 74 BPM | SYSTOLIC BLOOD PRESSURE: 111 MMHG | DIASTOLIC BLOOD PRESSURE: 75 MMHG

## 2024-08-27 DIAGNOSIS — C44.319 BASAL CELL CARCINOMA (BCC) OF SKIN OF OTHER PART OF FACE: ICD-10-CM

## 2024-08-27 PROCEDURE — 13131 CMPLX RPR F/C/C/M/N/AX/G/H/F: CPT | Performed by: DERMATOLOGY

## 2024-08-27 PROCEDURE — 17311 MOHS 1 STAGE H/N/HF/G: CPT | Performed by: DERMATOLOGY

## 2024-08-27 NOTE — PROGRESS NOTES
Mohs Surgery Operative Note    Date of Surgery:  8/27/2024  Surgeon:  Radha Gunn MD  Office Location:  [address] [facility]  [address] [facility]  [address]  [facility] [place] [address]  Dept: [phone]  Dept Fax: [phone]  Referring Provider: Yifan Torres MD  [address] [place], [address]  [place] [address]      Assessment/Plan   Pre-procedure:   Obtained informed consent: written from patient  The surgical site was identified and confirmed with the patient.     Intra-operative:   Audible time out called at : 11:05 AM 08/27/24  by: Christa Ortega LPN   Verified patient name, birthdate, site, specimen bottle label & requisition.    The planned procedure(s) was again reviewed with the patient. The risks of bleeding, infection, nerve damage and scarring were reviewed. Written authorization was obtained. The patient identity, surgical site, and planned procedure(s) were verified. The provider acted as both surgeon and pathologist.     Basal cell carcinoma (BCC) of skin of other part of face  Right Forehead  Mohs surgery  Consent obtained: written    Universal Protocol:  Procedure explained and questions answered to patient or proxy's satisfaction: Yes    Test results available and properly labeled: Yes    Pathology report reviewed: Yes    External notes reviewed: Yes    Photo or diagram used for site identification: Yes    Site/side marked: Yes    Slide independently reviewed by Mohs surgeon: Yes    Immediately prior to procedure a time out was called: Yes    Patient identity confirmed: verbally with patient  Preparation: Patient was prepped and draped in usual sterile fashion      Anticoagulation:  Is the patient taking prescription anticoagulant and/or aspirin prescribed/recommended by a physician? No    Was the anticoagulation regimen changed prior to Mohs? No      Anesthesia:  Anesthesia method: local infiltration  Local anesthetic: lidocaine 2% WITH epi    Procedure  Details:  Case ID Number: [id number]  Biopsy accession number: [id number]  Date of biopsy: 7/23/2024  Pre-Op diagnosis: basal cell carcinoma  BCC subtype: superficial  Surgery side: right  Surgical site (from skin exam): Right Forehead  Pre-operative length (cm): 0.3  Pre-operative width (cm): 0.3  Indications for Mohs surgery: anatomic location where tissue conservation is critical and ill-defined borders  Previously treated? No      Micrographic Surgery Details:  Post-operative length (cm): 0.4  Post-operative width (cm): 0.4  Number of Mohs stages: 1    Stage 1     Comments: The patient was brought into the operating room and placed in the procedure chair in the appropriate position.  The area positive by previous biopsy was identified and confirmed with the patient. The area of clinically obvious tumor was debulked using a curette and/or scalpel as needed. An incision was made following the Mohs approach through the skin. The specimen was taken to the lab, divided into 2 piece(s) and appropriately chromacoded and processed.  Tumor features identified on Mohs section: no tumor identified  Depth of defect: subcutaneous fat    Patient tolerance of procedure: tolerated well, no immediate complications    Reconstruction:  Was the defect reconstructed? Yes    Was reconstruction performed by the same Mohs surgeon? Yes    Setting of reconstruction: outpatient office  When was reconstruction performed? same day  Type of reconstruction: linear  Linear reconstruction: complex  Length of linear repair (cm): 1  Subcutaneous Layers (Deep Stitches)   Suture size:  5-0  Suture type:  Monocryl  Stitches:  Buried vertical mattress  Fine/surface layer approximation (top stitches)   Epidermal/Superficial suture size:  5-0  Epidermal/Superficial suture type:  Fast-absorbing gut  Stitches: simple running    Hemostasis achieved with: electrodesiccation  Outcome: patient tolerated procedure well with no complications    Post-procedure  details: sterile dressing applied and wound care instructions given    Dressing type: pressure dressing      Complex Linear Repair - Wide Undermining:  Given the location and size of the defect, it was determined that a complex layered linear closure was required to restore normal anatomy and function. The repair was considered complex because extensive undermining was required and performed. The amount of undermining performed was greater than the maximum width of the defect as measured perpendicular to the closure line along at least one entire edge of the defect. Standing cutaneous cones were removed using Burow's triangles. The wound edges were brought into close approximation with buried vertical mattress sutures. The remainder of the wound was then closed with epidermal top sutures.    The final repair measured 1 cm              Wound care was discussed, and the patient was given written post-operative wound care instructions.      The patient will follow up with Radha Gunn MD as needed for any post operative problems or concerns, and will follow up with their primary dermatologist as scheduled.

## 2024-08-27 NOTE — PROGRESS NOTES
Office Visit Note  Date: 8/27/2024  Surgeon:  Radha Gunn MD  Office Location:  [address] [facility]  [address]  [address]  [facility] [address]  Dept: [phone]  Dept Fax: [phone]  Referring Provider: Yifan Torres MD  [address]  [facility] [address]  [place] [address]    Subjective   Rosalie Vicente is a 54 y.o. female who presents for the following: MOHS Surgery (Right Upper Forehead- BCC)    According to the patient, the lesion has been present for approximately 6 months at the time of diagnosis.  The lesion is not causing symptoms.  The lesion has not been treated previously.    The patient does not have a pacemaker / defibrillator.  The patient does not have a heart valve / joint replacement.    The patient is not on blood thinners.  The patient does not have a history of hepatitis B or C.  The patient does not have a history of HIV.  The patient does not have a history of immunosuppression (e.g. organ transplantation, malignancy, medications)    Review of Systems:  No other skin or systemic complaints other than what is documented elsewhere in the note.    MEDICAL HISTORY: clinically relevant history including significant past medical history, medications and allergies was reviewed and documented in Epic.    Objective   Well appearing patient in no apparent distress; mood and affect are within normal limits.  Vital signs: See record.  Noted on the Right Forehead  Is a 0.3 x 0.3 cm scar      The patient confirmed the identified site.    Discussion:  The nature of the diagnosis was explained. The lesion is a skin cancer.  It has a risk of local growth and distant spread. The condition is associated with sun exposure.  Warning signs of non-melanoma skin cancer discussed. Patient was instructed to perform monthly self skin examination.  We recommended that the patient have regular full skin exams given an increased risk of subsequent skin cancers. The  patient was instructed to use sun protective behaviors including use of broad spectrum sunscreens and sun protective clothing to reduce risk of skin cancers.      Risks, benefits, side effects of Mohs surgery were discussed with patient and the patient voiced understanding.  It was explained that even though the cure rate of Mohs is very high it is not 100%. Risks of surgery including but not limited to bleeding, infection, numbness, nerve damage, and scar were reviewed.  Discussion included wound care requirements, activity restrictions, likely scar outcome and time to heal.     After Mohs surgery, the defect may need to be repaired surgically and the scar may be longer than the original lesion.  Reconstruction options, risks, and benefits were reviewed including second intention healing, linear repair (4-1 ratio was explained), local flaps, skin grafts, cartilage grafts and interpolation flaps (the need for multiple surgeries was explained). Possible outcomes were reviewed including likely scar appearance, failure of flap survival, infection, bleeding and the need for revision surgery.     The pathology was reviewed, the photograph was reviewed, and the referring physician's note was reviewed.    Patient elected for Mohs surgery.

## 2024-09-03 ENCOUNTER — APPOINTMENT (OUTPATIENT)
Dept: DERMATOLOGY | Facility: CLINIC | Age: 54
End: 2024-09-03
Payer: COMMERCIAL

## 2024-09-03 ENCOUNTER — LAB (OUTPATIENT)
Dept: LAB | Facility: LAB | Age: 54
End: 2024-09-03
Payer: COMMERCIAL

## 2024-09-03 DIAGNOSIS — D64.9 ANEMIA, UNSPECIFIED TYPE: ICD-10-CM

## 2024-09-03 DIAGNOSIS — D03.72 MELANOMA IN SITU OF LEFT LOWER LEG (MULTI): Primary | ICD-10-CM

## 2024-09-03 DIAGNOSIS — Z00.00 ROUTINE GENERAL MEDICAL EXAMINATION AT A HEALTH CARE FACILITY: ICD-10-CM

## 2024-09-03 LAB
ALT SERPL W P-5'-P-CCNC: 13 U/L (ref 7–45)
CHOLEST SERPL-MCNC: 268 MG/DL (ref 0–199)
CHOLESTEROL/HDL RATIO: 3.5
ERYTHROCYTE [DISTWIDTH] IN BLOOD BY AUTOMATED COUNT: 14.4 % (ref 11.5–14.5)
FERRITIN SERPL-MCNC: 36 NG/ML (ref 8–150)
HCT VFR BLD AUTO: 43.2 % (ref 36–46)
HDLC SERPL-MCNC: 77 MG/DL
HGB BLD-MCNC: 13.7 G/DL (ref 12–16)
IRON SATN MFR SERPL: 30 % (ref 25–45)
IRON SERPL-MCNC: 132 UG/DL (ref 35–150)
LDLC SERPL CALC-MCNC: 135 MG/DL
MCH RBC QN AUTO: 28.1 PG (ref 26–34)
MCHC RBC AUTO-ENTMCNC: 31.7 G/DL (ref 32–36)
MCV RBC AUTO: 89 FL (ref 80–100)
NON HDL CHOLESTEROL: 191 MG/DL (ref 0–149)
NRBC BLD-RTO: 0 /100 WBCS (ref 0–0)
PLATELET # BLD AUTO: 309 X10*3/UL (ref 150–450)
RBC # BLD AUTO: 4.88 X10*6/UL (ref 4–5.2)
TIBC SERPL-MCNC: 439 UG/DL (ref 240–445)
TRIGL SERPL-MCNC: 279 MG/DL (ref 0–149)
TSH SERPL-ACNC: 3 MIU/L (ref 0.44–3.98)
UIBC SERPL-MCNC: 307 UG/DL (ref 110–370)
VLDL: 56 MG/DL (ref 0–40)
WBC # BLD AUTO: 7.3 X10*3/UL (ref 4.4–11.3)

## 2024-09-03 PROCEDURE — 84443 ASSAY THYROID STIM HORMONE: CPT

## 2024-09-03 PROCEDURE — 80061 LIPID PANEL: CPT

## 2024-09-03 PROCEDURE — 84460 ALANINE AMINO (ALT) (SGPT): CPT

## 2024-09-03 PROCEDURE — 85027 COMPLETE CBC AUTOMATED: CPT

## 2024-09-03 PROCEDURE — 83550 IRON BINDING TEST: CPT

## 2024-09-03 PROCEDURE — 82728 ASSAY OF FERRITIN: CPT

## 2024-09-03 PROCEDURE — 11602 EXC TR-EXT MAL+MARG 1.1-2 CM: CPT | Performed by: DERMATOLOGY

## 2024-09-03 PROCEDURE — 83540 ASSAY OF IRON: CPT

## 2024-09-03 PROCEDURE — 36415 COLL VENOUS BLD VENIPUNCTURE: CPT

## 2024-09-03 NOTE — PROGRESS NOTES
xcision Operative Note    Date of Surgery:  9/3/2024  Surgeon:  Radha Gunn MD  Office Location:  [address] [facility]  [address] [facility]  [address]  [facility] [address]  Dept: [phone]  Dept Fax: [phone]  Referring Provider: Yifan Torres MD  [address]  [facility] [address]  [place] [address]    Nikky Vicente is a 54 y.o. female who presents for the following: Excision (Left Posterior Distal Leg - Malignant Melanoma In Situ) for melanoma.    According to the patient, the lesion has been present for approximately 6 months at the time of diagnosis.  The lesion is not causing symptoms.  The lesion has not been treated previously.    The patient does not have a pacemaker / defibrillator.  The patient does not have a heart valve / joint replacement.    The patient is not on blood thinners.   The patient does not have a history of hepatitis B or C.  The patient does not have a history of HIV.  The patient does not have a history of immunosuppression (e.g. organ transplantation, malignancy, medications)    Is it okay to leave a phone message with results? Y  Who else may we leave results with: (name, relationship)     The following portions of the chart were reviewed this encounter and updated as appropriate:         Assessment/Plan   Pre-procedure:   Obtained informed consent: written from patient  The surgical site was identified and confirmed with the patient.     Intra-operative:   Audible time out called at : 2:40 PM 09/03/24  by: Christa Ortega LPN   Verified patient name, birthdate, site, specimen bottle label & requisition.    The planned procedure(s) was again reviewed with the patient. The risks of bleeding, infection, nerve damage and scarring were reviewed. The patient identity, surgical site, and planned procedure(s) were verified.     Biopsy Accession Number: [id number]  Melanoma of skin (Multi)  Left Posterior Distal Leg    Skin  excision    Lesion length (cm):  0.8  Lesion width (cm):  0.6  Margin per side (cm):  0.5  Total excision diameter (cm):  1.8  Informed consent: discussed and consent obtained    Timeout: patient name, date of birth, surgical site, and procedure verified    Procedure prep:  Patient was prepped and draped  Anesthesia: the lesion was anesthetized in a standard fashion    Anesthetic:  Lidocaine 2% with epinephrine  Instrument used: #15 blade    Hemostasis achieved with: electrodesiccation    Outcome: patient tolerated procedure well with no complications    Post-procedure details: sterile dressing applied and wound care instructions given    Dressing type: pressure dressing    Additional details:  Melanoma Erica:   Curative Intent: Yes  Original Breslow Thickness: MIS  Clinical margin width: 0.5 cm  Depth of excision: Full thickness     Skin repair  Complexity:  Secondary Intention  Informed consent: discussed and consent obtained    Hemostasis achieved with: electrodesiccation  Outcome: patient tolerated procedure well with no complications    Post-procedure details: sterile dressing applied and wound care instructions given    Dressing type: pressure dressing    Additional details:  Repair: After a discussion with the patient regarding the options for wound closure, a decision was made to proceed with second intention healing.  Dressing F/U: Surgifoam was placed in the wound. A pressure dressing was placed to help stabilize the wound and to minimize the risk of postoperative bleeding. Wound care was discussed, and the patient was given written post-operative wound care instructions.     Specimen 1 - Dermatopathology- DERM LAB  Differential Diagnosis: Melanoma In Situ  Check Margins Yes  Comments:  Left Posterior Distal Leg  Dermpath Lab: Routine Histopathology (formalin-fixed tissue)        Wound care was discussed, and the patient was given written post-operative wound care instructions.      The patient will follow  up with Radha Gunn MD as needed for any post operative problems or concerns, and will follow up with their primary dermatologist as scheduled.

## 2024-09-05 LAB
LABORATORY COMMENT REPORT: NORMAL
PATH REPORT.FINAL DX SPEC: NORMAL
PATH REPORT.GROSS SPEC: NORMAL
PATH REPORT.MICROSCOPIC SPEC OTHER STN: NORMAL
PATH REPORT.RELEVANT HX SPEC: NORMAL
PATH REPORT.TOTAL CANCER: NORMAL

## 2024-09-06 NOTE — RESULT ENCOUNTER NOTE
Larry Vicente - your results show the skin cancer has been completely removed. No further treatment required. Continue skin checks every 3-4 months with your primary dermatologist. Please call/message us with any questions/concerns.

## 2024-09-10 ENCOUNTER — TELEPHONE (OUTPATIENT)
Dept: DERMATOLOGY | Facility: CLINIC | Age: 54
End: 2024-09-10

## 2024-09-10 ENCOUNTER — APPOINTMENT (OUTPATIENT)
Dept: PRIMARY CARE | Facility: CLINIC | Age: 54
End: 2024-09-10
Payer: COMMERCIAL

## 2024-09-10 VITALS
BODY MASS INDEX: 25.82 KG/M2 | DIASTOLIC BLOOD PRESSURE: 64 MMHG | TEMPERATURE: 98 F | SYSTOLIC BLOOD PRESSURE: 100 MMHG | WEIGHT: 132.2 LBS

## 2024-09-10 DIAGNOSIS — Z13.6 SCREENING FOR HEART DISEASE: ICD-10-CM

## 2024-09-10 DIAGNOSIS — R35.1 NOCTURIA: Primary | ICD-10-CM

## 2024-09-10 DIAGNOSIS — E03.8 SUBCLINICAL HYPOTHYROIDISM: ICD-10-CM

## 2024-09-10 DIAGNOSIS — F41.9 ANXIETY: ICD-10-CM

## 2024-09-10 DIAGNOSIS — J45.20 INTERMITTENT ASTHMA WITHOUT COMPLICATION, UNSPECIFIED ASTHMA SEVERITY (HHS-HCC): ICD-10-CM

## 2024-09-10 DIAGNOSIS — E78.5 BORDERLINE HYPERLIPIDEMIA: ICD-10-CM

## 2024-09-10 PROCEDURE — 99214 OFFICE O/P EST MOD 30 MIN: CPT | Performed by: INTERNAL MEDICINE

## 2024-09-10 RX ORDER — ESCITALOPRAM OXALATE 10 MG/1
10 TABLET ORAL DAILY
COMMUNITY
End: 2024-09-10 | Stop reason: SDUPTHER

## 2024-09-10 RX ORDER — ESCITALOPRAM OXALATE 10 MG/1
10 TABLET ORAL DAILY
Qty: 90 TABLET | Refills: 1 | Status: SHIPPED | OUTPATIENT
Start: 2024-09-10 | End: 2025-03-09

## 2024-09-10 RX ORDER — FLUTICASONE FUROATE AND VILANTEROL 200; 25 UG/1; UG/1
1 POWDER RESPIRATORY (INHALATION) DAILY
Qty: 1 EACH | Refills: 2 | Status: SHIPPED | OUTPATIENT
Start: 2024-09-10 | End: 2025-03-09

## 2024-09-10 RX ORDER — ALBUTEROL SULFATE 90 UG/1
2 INHALANT RESPIRATORY (INHALATION) DAILY
Qty: 18 G | Refills: 2 | Status: SHIPPED | OUTPATIENT
Start: 2024-09-10

## 2024-09-10 ASSESSMENT — PATIENT HEALTH QUESTIONNAIRE - PHQ9
1. LITTLE INTEREST OR PLEASURE IN DOING THINGS: NOT AT ALL
2. FEELING DOWN, DEPRESSED OR HOPELESS: NOT AT ALL
SUM OF ALL RESPONSES TO PHQ9 QUESTIONS 1 AND 2: 0

## 2024-09-10 ASSESSMENT — ENCOUNTER SYMPTOMS
DEPRESSION: 0
LOSS OF SENSATION IN FEET: 0
OCCASIONAL FEELINGS OF UNSTEADINESS: 0

## 2024-09-10 NOTE — TELEPHONE ENCOUNTER
Pt called in regards to her surgical wound from last week done by Dr. Gunn on her left leg. Pt states the wound is painful to the touch starting yesterday. Denies green drainage, foul odor. Pt sent phot and Dr. Gunn reviewed. Pt is coming in tomorrow for separate surgery and we will culture the wound at this time. Pt was advised to continue elevating, icing and using a compression stocking until her appt tomorrow.

## 2024-09-10 NOTE — PROGRESS NOTES
Subjective   Patient ID: Rosalie Vicente is a 54 y.o. female who presents for Anxiety.    Anxiety         Overall doing well.  Anxiety well-controlled.  Working with gynecologist.  Otherwise feels well.  No abdominal pain.  No heartburn.  No change in bowels.  Anxiety and insomnia overall well-controlled with increased dose of escitalopram.  Under increased stress.  Has had difficulty with weight control.  Despite eating well and exercise difficult to keep weight under target.  Review of Systems  All systems reviewed and negative except as per history of present illness  Objective   /64 (BP Location: Left arm, Patient Position: Sitting, BP Cuff Size: Adult)   Temp 36.7 °C (98 °F) (Skin)   Wt 60 kg (132 lb 3.2 oz)   BMI 25.82 kg/m²     Physical Exam  Alert and oriented x 3.  No acute distress    Lab Results   Component Value Date    WBC 7.3 09/03/2024    HGB 13.7 09/03/2024    HCT 43.2 09/03/2024     09/03/2024    CHOL 268 (H) 09/03/2024    TRIG 279 (H) 09/03/2024    HDL 77.0 09/03/2024    ALT 13 09/03/2024    AST 14 01/17/2020     02/03/2023    K 3.9 02/03/2023     02/03/2023    CREATININE 0.75 02/03/2023    BUN 12 02/03/2023    CO2 27 02/03/2023    TSH 3.00 09/03/2024    INR 1.0 09/11/2018    HGBA1C 5.3 02/03/2023       Assessment/Plan       #1 anxiety-improved. Continue trazodone 100 mg and  Lexapro.. Consider BuSpar in the future versus increase Lexapro.  #2 insomnia-remains a major issue. Continue current treatment.    #3 asthma-well-controlled. Continue current treatment plan.   #4 EoE esosophagitis-reviewed. Follow-up nutrition and GI.  Patient due to see GI.  Reviewed   #5 wrist pain-consult Occupational Therapy. If not improving, hand orthopedist.  #6 colon polyps- colo 2022--> 2027  #7 vit D- 2,000 units qd.   #8 mild anemia-reviewed.  LEIA.  Discussed differential diagnosis.  Still likely related to menstrual blood loss.  Begin iron 1 pill every other day.  Recheck labs.   Consult to GI for consideration of further evaluation.  #9 lipids-modestly elevated LDL. ASCVD risk score low. Reviewed diet and exercise.  Will obtain coronary artery calcium scan  #10 skin [id number] dermatology  #11 nocturia-consult urogynecology normal

## 2024-09-11 ENCOUNTER — APPOINTMENT (OUTPATIENT)
Dept: DERMATOLOGY | Facility: CLINIC | Age: 54
End: 2024-09-11
Payer: COMMERCIAL

## 2024-09-11 DIAGNOSIS — L92.9 GRANULATION TISSUE OF SKIN: Primary | ICD-10-CM

## 2024-09-11 DIAGNOSIS — D48.5 NEOPLASM OF UNCERTAIN BEHAVIOR OF SKIN: ICD-10-CM

## 2024-09-11 DIAGNOSIS — Z51.89 VISIT FOR WOUND CHECK: ICD-10-CM

## 2024-09-11 PROCEDURE — 12032 INTMD RPR S/A/T/EXT 2.6-7.5: CPT | Performed by: DERMATOLOGY

## 2024-09-11 PROCEDURE — 99213 OFFICE O/P EST LOW 20 MIN: CPT | Performed by: DERMATOLOGY

## 2024-09-11 PROCEDURE — 87077 CULTURE AEROBIC IDENTIFY: CPT | Performed by: DERMATOLOGY

## 2024-09-11 PROCEDURE — 11402 EXC TR-EXT B9+MARG 1.1-2 CM: CPT | Performed by: DERMATOLOGY

## 2024-09-11 RX ORDER — MUPIROCIN 20 MG/G
OINTMENT TOPICAL DAILY
Qty: 30 G | Refills: 1 | Status: SHIPPED | OUTPATIENT
Start: 2024-09-11 | End: 2024-09-21

## 2024-09-11 RX ORDER — DOXYCYCLINE 100 MG/1
100 CAPSULE ORAL 2 TIMES DAILY
Qty: 20 CAPSULE | Refills: 0 | Status: SHIPPED | OUTPATIENT
Start: 2024-09-11 | End: 2024-09-21

## 2024-09-11 NOTE — PROGRESS NOTES
Office Follow Up Note    Visit Summary  Chief Complaint    1. Complaint Wound check.    Rosalie Vicente is a 54 y.o. female who presents for 1 week follow up after surgery for a melanoma. The patient has concerns of increasing pain and drainage since Monday.    Location Operation site location: Left lower leg    On exam,  Ms. Vicente is well-appearing and in no apparent distress. The surgical site appears clean with minimal to no erythema. Mild tenderness.     Assessment and Plan:  Wound culture obtained today. Will start oral doxycycline 100mg BID for 10 days. Okay to start topical mupirocin once daily in the morning, then continue vaseline in the evening.  Switch from body wash to a mild soap like dove or ivory to the wound daily  History of skin cancer requiring ongoing monitoring for recurrence and additional lesion development.   The patient was reassured that the wound is healing appropriately.   The dressing was removed, the wound cleaned a a new dressing reapplied.     The patient was advised on the importance of routine skin monitoring including follow up with general dermatology and instructed to call with any further concerns. The patient will return in 5 weeks.

## 2024-09-11 NOTE — PROGRESS NOTES
Excision Operative Note    Date of Surgery:  9/11/2024  Surgeon:  Radha Gunn MD  Office Location:  [address] [facility]  [address] [facility]  [address]  [facility] [address]  Dept: [phone]  Dept Fax: [phone]  Referring Provider: Yifan Torres MD  [address]  [facility] [address]  [place] [address]    Nikky Vicente is a 54 y.o. female who presents for the following: Excision (Right Medial Mid- Forearm) for neoplasm of uncertain behavior of skin.    According to the patient, the lesion has been present for approximately 1 month at the time of diagnosis.  The lesion is not causing symptoms.  The lesion has not been treated previously.    The patient does not have a pacemaker / defibrillator.  The patient does not have a heart valve / joint replacement.    The patient is not on blood thinners.   The patient does not have a history of hepatitis B or C.  The patient does not have a history of HIV.  The patient does not have a history of immunosuppression (e.g. organ transplantation, malignancy, medications)    Is it okay to leave a phone message with results? Y    The following portions of the chart were reviewed this encounter and updated as appropriate:         Assessment/Plan   Pre-procedure:   Obtained informed consent: written from patient  The surgical site was identified and confirmed with the patient.     Intra-operative:   Audible time out called at : 1:35PM 09/11/24  by: Hilda Estrada RN   Verified patient name, birthdate, site, specimen bottle label & requisition.    The planned procedure(s) was again reviewed with the patient. The risks of bleeding, infection, nerve damage and scarring were reviewed. The patient identity, surgical site, and planned procedure(s) were verified.     Biopsy Accession Number: [id number]  Neoplasm of uncertain behavior of skin  Right Medial Mid- Forearm    Skin excision    Lesion length (cm):  0.8  Lesion width  (cm):  0.6  Margin per side (cm):  0.5  Total excision diameter (cm):  1.8  Informed consent: discussed and consent obtained    Timeout: patient name, date of birth, surgical site, and procedure verified    Procedure prep:  Patient prepped in sterile fashion  Anesthesia: the lesion was anesthetized in a standard fashion    Anesthetic:  Lidocaine 2% with epinephrine  Instrument used: #15 blade    Hemostasis achieved with: Electrocautery pen    Outcome: patient tolerated procedure well with no complications    Post-procedure details: sterile dressing applied and wound care instructions given    Dressing type: pressure dressing, Telfa pad and Hypafix    Additional details:  The possible diagnoses were explained. Although the lesion is likely benign, the patient requests removal of the lesion because of the symptoms it is causing. Excision was discussed with the patient. The risks, benefits and potential adverse effects were reviewed. Discussion included but was not limited to the cure rate, relative cost, wound care requirements, activity restrictions, likely scar outcome and time to heal were reviewed. Alternative options including monitoring the lesion were discussed. The patient elected to proceed with excision.     Skin repair  Complexity:  Intermediate  Final length (cm):  4  Informed consent: discussed and consent obtained    Timeout: patient name, date of birth, surgical site, and procedure verified    Procedure prep:  Patient prepped in sterile fashion  Anesthesia: the lesion was anesthetized in a standard fashion    Anesthetic:  2% lidocaine w/ epinephrine 1-100,000 local infiltration  Reason for type of repair: reduce tension to allow closure    Undermining: edges undermined    Subcutaneous layers (deep stitches):   Suture size:  3-0  Suture type: Vicryl (polyglactin 910)    Stitches:  Buried vertical mattress  Fine/surface layer approximation (top stitches):   Suture size:  5-0  Suture type: fast-absorbing  plain gut    Stitches: simple running    Hemostasis achieved with: electrodesiccation  Outcome: patient tolerated procedure well with no complications    Post-procedure details: sterile dressing applied and wound care instructions given    Dressing type: pressure dressing      Staff Communication: Dermatology Local Anesthesia: 2 % Lidocaine / Epinephrine - Amount: 4.5 cc    Specimen 1 - Dermatopathology- DERM LAB  Differential Diagnosis: Atypical Melanocytic Proliferation  Check Margins Yes  Dermpath Lab: Routine Histopathology (formalin-fixed tissue)    Intermediate Linear Repair:  Given the location and size of the defect, it was determined that an intermediate layered linear closure was required to restore normal anatomy and function. The repair is an intermediate closure as two layers of sutures were required. The defect was undermined extensively at the level of the subcutaneous plane. Standing cutaneous cones were removed using Burow's triangles. The wound edges were brought into close approximation with buried vertical mattress sutures. The remainder of the wound was then closed with epidermal top sutures.    The final repair measured 4 cm    Wound care was discussed, and the patient was given written post-operative wound care instructions.        The patient will follow up with Radha Gunn MD as needed for any post operative problems or concerns, and will follow up with their primary dermatologist as scheduled.

## 2024-09-14 ENCOUNTER — HOSPITAL ENCOUNTER (OUTPATIENT)
Dept: RADIOLOGY | Facility: HOSPITAL | Age: 54
Discharge: HOME | End: 2024-09-14
Payer: COMMERCIAL

## 2024-09-14 DIAGNOSIS — Z13.6 SCREENING FOR HEART DISEASE: ICD-10-CM

## 2024-09-14 LAB
BACTERIA SPEC CULT: ABNORMAL
GRAM STN SPEC: ABNORMAL
GRAM STN SPEC: ABNORMAL

## 2024-09-14 PROCEDURE — 75571 CT HRT W/O DYE W/CA TEST: CPT

## 2024-09-16 ENCOUNTER — TELEPHONE (OUTPATIENT)
Dept: DERMATOLOGY | Facility: CLINIC | Age: 54
End: 2024-09-16
Payer: COMMERCIAL

## 2024-09-16 ENCOUNTER — HOSPITAL ENCOUNTER (OUTPATIENT)
Dept: RADIOLOGY | Facility: CLINIC | Age: 54
Discharge: HOME | End: 2024-09-16
Payer: COMMERCIAL

## 2024-09-16 VITALS — BODY MASS INDEX: 25.91 KG/M2 | WEIGHT: 132 LBS | HEIGHT: 60 IN

## 2024-09-16 DIAGNOSIS — T81.49XA WOUND INFECTION AFTER SURGERY: Primary | ICD-10-CM

## 2024-09-16 DIAGNOSIS — Z12.31 BREAST CANCER SCREENING BY MAMMOGRAM: ICD-10-CM

## 2024-09-16 PROCEDURE — 77067 SCR MAMMO BI INCL CAD: CPT | Performed by: RADIOLOGY

## 2024-09-16 PROCEDURE — 77067 SCR MAMMO BI INCL CAD: CPT

## 2024-09-16 PROCEDURE — 77063 BREAST TOMOSYNTHESIS BI: CPT | Performed by: RADIOLOGY

## 2024-09-16 RX ORDER — GENTAMICIN SULFATE 1 MG/G
OINTMENT TOPICAL 3 TIMES DAILY
Qty: 30 G | Refills: 2 | Status: SHIPPED | OUTPATIENT
Start: 2024-09-16 | End: 2024-09-26

## 2024-09-16 NOTE — TELEPHONE ENCOUNTER
Pt called to inquire if her prescription was sent to the Pharmacy. I spoke with Dr. DESEAN Gunn and she stated that the patients prescription was sent to the pharmacy. I left a voicemail for the patient.

## 2024-09-16 NOTE — RESULT ENCOUNTER NOTE
Please let patient know:    1. Excision from forearm- your results show the atypical lesion has been completely removed. No further treatment required. Continue skin checks every 6-12 months with your primary dermatologist. Please call/message us with any questions/concerns.     2. Wound culture- consistent with klebsiella/raoultella. Will start topical gentamicin ointment and vinegar soaks.    Vinegar soaks   Vinegar soaks are used to treat inflamed, red conditions of the skin, including skin around the legs. Vinegar contains acetic acid which kills fungi and bacteria.   Here is how to use the soaks:   -mix one part plain white vinegar with 3 or 4 parts water in a small bowl   -use a small gauze or tissue and soak it with the vinegar solution   -you want the gauze sopping wet (not dripping or damp but somewhere in between)   -apply the cloth over the affected area and leave it there for 10 or 15 minutes   -then let the area air dry (or use a dryer on the cool setting to dry the area faster)   -repeat this 3 or 4 times a day   -apply the topical gentamicin ointment after using the vinegar washes    This has a wonderful anti-inflammatory action and will dramatically reduce redness and weeping. The idea is to dry out the area but not over-dry the skin - so don't overdo it. You will usually dry out the skin and reduce the redness within 3 days. If you experience burning or irritation - then you can just use tap water soaks without the vinegar and get good results as well.

## 2024-09-18 ENCOUNTER — TELEPHONE (OUTPATIENT)
Dept: DERMATOLOGY | Facility: CLINIC | Age: 54
End: 2024-09-18
Payer: COMMERCIAL

## 2024-09-18 DIAGNOSIS — L08.9 WOUND INFECTION: ICD-10-CM

## 2024-09-18 DIAGNOSIS — L92.9 GRANULATION TISSUE OF SKIN: Primary | ICD-10-CM

## 2024-09-18 DIAGNOSIS — T14.8XXA WOUND INFECTION: ICD-10-CM

## 2024-09-18 RX ORDER — AMOXICILLIN AND CLAVULANATE POTASSIUM 875; 125 MG/1; MG/1
875 TABLET, FILM COATED ORAL 2 TIMES DAILY
Qty: 14 TABLET | Refills: 0 | Status: SHIPPED | OUTPATIENT
Start: 2024-09-18 | End: 2024-09-25

## 2024-09-18 NOTE — TELEPHONE ENCOUNTER
Pt called with questions about when she can discontinue the vinegar baths on her leg. When she is doing the vinegar bath the wound is very painful. Dr. Gunn advised that she can stop doing the vinegar baths, continue washing, gentamicin to the wound and bandage. After she is done using the gentamicin ointment for ten days she can resume using vaseline to the wound. Pt had questions regarding taking doxycycline. Left message for pt to discontinue using doxycycline since the infection should gram negative and doxycycline does not cover it. Dr. Gunn prescribed Augmentin, it was sent to her pharmacy and was advised to start it when she picks it up.

## 2024-09-20 ENCOUNTER — PATIENT MESSAGE (OUTPATIENT)
Dept: PRIMARY CARE | Facility: CLINIC | Age: 54
End: 2024-09-20
Payer: COMMERCIAL

## 2024-09-20 DIAGNOSIS — Q24.8 PERICARDIAL CYST (HHS-HCC): Primary | ICD-10-CM

## 2024-10-01 ENCOUNTER — APPOINTMENT (OUTPATIENT)
Dept: DERMATOLOGY | Facility: CLINIC | Age: 54
End: 2024-10-01
Payer: COMMERCIAL

## 2024-10-11 ENCOUNTER — APPOINTMENT (OUTPATIENT)
Dept: DERMATOLOGY | Facility: CLINIC | Age: 54
End: 2024-10-11
Payer: COMMERCIAL

## 2024-10-11 DIAGNOSIS — Z51.89 VISIT FOR WOUND CHECK: Primary | ICD-10-CM

## 2024-10-11 PROCEDURE — 99024 POSTOP FOLLOW-UP VISIT: CPT | Performed by: DERMATOLOGY

## 2024-10-11 NOTE — PROGRESS NOTES
Office Follow Up Note    Visit Summary  Chief Complaint    1. Complaint Wound check.    Rosalie Vicente is a 54 y.o. female who presents for 4 week, 5 week, and 8 week follow up after surgery for a neoplasm uncertain, melanoma, and basal cell carcinoma The patient has concerns of potential spitting sutures, drainage.    Location Operation site location: Right upper cutaneous lip, right upper forehead - BCC. Melanoma- left posterior distal leg. AMP- right medial mid forearm.    On exam,  Ms. Vicente is well-appearing and in no apparent distress.   - Right upper forehead: papule c/w evolving spitting sutures  - Right upper lip, focal papule along inferior pole of mucosal lip  - Left posterior distal leg: will granulating wound with advancing skin edge  - Right medial mid forearm: well healing scar    Assessment and Plan:  History of skin cancer requiring ongoing monitoring for recurrence and additional lesion development.   - Right upper forehead: papule deroofed with no evidence of spitting suture, reassured  - Right upper lip, focal papule along inferior pole of mucosal lip: discussed possible need for revision surgery. Encouraged to call in 4 week if area does not resolve.  - Left posterior distal leg: continue vaseline and bandage  - Right medial mid forearm: okay to discontinue bandages.    The dressing was removed, the wound cleaned a a new dressing reapplied.     The patient was advised on the importance of routine skin monitoring including follow up with general dermatology and instructed to call with any further concerns. The patient will return in 4 week or sooner as needed

## 2024-10-14 ENCOUNTER — APPOINTMENT (OUTPATIENT)
Dept: DERMATOLOGY | Facility: CLINIC | Age: 54
End: 2024-10-14
Payer: COMMERCIAL

## 2024-10-15 PROCEDURE — RXMED WILLOW AMBULATORY MEDICATION CHARGE

## 2024-10-17 ENCOUNTER — PHARMACY VISIT (OUTPATIENT)
Dept: PHARMACY | Facility: CLINIC | Age: 54
End: 2024-10-17
Payer: COMMERCIAL

## 2024-10-21 ENCOUNTER — APPOINTMENT (OUTPATIENT)
Dept: DERMATOLOGY | Facility: CLINIC | Age: 54
End: 2024-10-21
Payer: COMMERCIAL

## 2024-10-28 ENCOUNTER — APPOINTMENT (OUTPATIENT)
Dept: DERMATOLOGY | Facility: CLINIC | Age: 54
End: 2024-10-28
Payer: COMMERCIAL

## 2025-01-12 DIAGNOSIS — C43.9 MELANOMA OF SKIN (MULTI): ICD-10-CM

## 2025-01-12 NOTE — TUMOR BOARD NOTE
General Patient Information  Name:  Rosalie Vicente  Evaluation #:  2  Conference Date:  1/13/2025  YOB: 1970  MRN:  [id number]  Program Physician(s):  Gordon Arguello  Referring Physician(s):  Yifan Torres, Alexandra Noel(PCP)      Summary   Stage:      Assessment:  Atypical intraepidermal melanocytic proliferation of the left posterior distal leg with concern for melanoma in situ. S/p WLE with 5 mm margins. Adequately surgically treated.    Recommendation:  Annual H&P.    Review Multidisciplinary Cutaneous Oncology Conference recommendation with patient.  Continue routine follow up and total body skin exams with Yifan Torres.    Follow Up:  Yifan Torres      History and Physical Exam  Dermatologic History:   54 y.o. female with a biopsy of the left posterior distal leg on 7/23/2024 showing an atypical intraepidermal melanocytic proliferation with concern for a melanoma in situ. She underwent a WLE with 5 mm margins on 9/3/2024 which showed changes consistent with previous procedure, inked margins free in the planes of sections examined without residual atypical melanocytic neoplasm seen.        Past Medical History:    Past Medical History:   Diagnosis Date    Encounter for other preprocedural examination 09/10/2018    Preoperative testing    Other abnormal and inconclusive findings on diagnostic imaging of breast 03/05/2019    Abnormal screening mammogram    Pain in left shoulder 02/05/2018    Acute pain of left shoulder    Personal history of other diseases of the respiratory system 03/21/2017    History of acute bronchitis    Urinary tract infection, site not specified 04/23/2019    Acute UTI         Skin:  Left Posterior Distal Leg  6 mm dark brown pigmented, asymmetric macule with an asymmetric pigment network and irregular borders           Pathology  Dermatopathology- DERM LAB: [id number]  Order: [id number]   Collected 9/3/2024 14:08       Status: Final result       Visible to  patient: Yes (not seen)       Dx: Melanoma in situ of left lower leg (M...    1 Result Note       1 Patient Communication      Component    FINAL DIAGNOSIS   SKIN, LEFT POSTERIOR DISTAL LEG, EXCISION:  CHANGES CONSISTENT WITH PREVIOUS PROCEDURE, INKED MARGINS FREE IN THE PLANES OF SECTIONS EXAMINED, WITHOUT RESIDUAL ATYPICAL MELANOCYTIC NEOPLASM SEEN.     ** Electronically signed out by Lisandra Baires MD **               Dermatopathology- DERM LAB: [id number]  Order: [id number]   Collected 7/23/2024 14:27       Status: Final result       Visible to patient: Yes (seen)       Dx: Neoplasm of uncertain behavior of skin    1 Result Note      Component    FINAL DIAGNOSIS     E. SKIN, LEFT POSTERIOR DISTAL LEG, SHAVE EXCISION:  ATYPICAL INTRAEPIDERMAL MELANOCYTIC PROLIFERATION (SEE COMMENT):     Comment: The bisected shave specimen reveals scattered single unit and occasional nested mild to severely atypical junction and intraepidermal melanocytes with overlying basket-weave orthokeratosis and mild papillary dermal fibrosis. There is a patchy superficial lymphocytic infiltrate. The intraepidermal melanocytic proliferation extends to a peripheral margin. SOX-10 immunostain (control appropriate) confirms the poor nesting and pagetoid scatter without confluence. A PRAME immunostain (control appropriate) is positive in the larger atypical melanocytes (< 50% of lesional melanocytes).     The increased architectural disorder may at least in part be secondary to the anatomic location, however the architectural disorder combined with the cytologic atypia are such that the differential diagnosis includes de lulú intraepidermal epithelioid melanocytic dysplasia and evolving malignant melanoma in situ. Complete re-excision is recommended.      **Electronically signed out by LOIS CASTRO MD**     Electronically signed by Lois Castro MD on 7/30/2024 at 1106        By the signature on this report, the individual or group listed as  making the Final Interpretation/Diagnosis certifies that they have reviewed this case.    Clinical History    Encounter Diagnosis: Neoplasm of uncertain behavior of skin

## 2025-01-13 ENCOUNTER — TUMOR BOARD CONFERENCE (OUTPATIENT)
Dept: HEMATOLOGY/ONCOLOGY | Facility: HOSPITAL | Age: 55
End: 2025-01-13
Payer: COMMERCIAL

## 2025-01-21 ENCOUNTER — APPOINTMENT (OUTPATIENT)
Dept: DERMATOLOGY | Facility: CLINIC | Age: 55
End: 2025-01-21
Payer: COMMERCIAL

## 2025-02-04 ENCOUNTER — APPOINTMENT (OUTPATIENT)
Dept: DERMATOLOGY | Facility: CLINIC | Age: 55
End: 2025-02-04
Payer: COMMERCIAL

## 2025-04-02 ENCOUNTER — APPOINTMENT (OUTPATIENT)
Dept: OBSTETRICS AND GYNECOLOGY | Facility: CLINIC | Age: 55
End: 2025-04-02
Payer: COMMERCIAL